# Patient Record
Sex: MALE | Race: WHITE | Employment: OTHER | URBAN - METROPOLITAN AREA
[De-identification: names, ages, dates, MRNs, and addresses within clinical notes are randomized per-mention and may not be internally consistent; named-entity substitution may affect disease eponyms.]

---

## 2017-02-24 ENCOUNTER — TELEPHONE (OUTPATIENT)
Dept: CARDIOLOGY CLINIC | Age: 82
End: 2017-02-24

## 2017-02-24 ENCOUNTER — OFFICE VISIT (OUTPATIENT)
Dept: CARDIOLOGY CLINIC | Age: 82
End: 2017-02-24

## 2017-02-24 VITALS
SYSTOLIC BLOOD PRESSURE: 140 MMHG | WEIGHT: 184.8 LBS | HEIGHT: 71 IN | DIASTOLIC BLOOD PRESSURE: 84 MMHG | BODY MASS INDEX: 25.87 KG/M2 | HEART RATE: 71 BPM

## 2017-02-24 DIAGNOSIS — I25.10 ATHEROSCLEROSIS OF NATIVE CORONARY ARTERY OF NATIVE HEART WITHOUT ANGINA PECTORIS: Primary | ICD-10-CM

## 2017-02-24 DIAGNOSIS — R55 SYNCOPE AND COLLAPSE: ICD-10-CM

## 2017-02-24 RX ORDER — LOSARTAN POTASSIUM 25 MG/1
25 TABLET ORAL DAILY
COMMUNITY
End: 2017-03-10 | Stop reason: SDUPTHER

## 2017-02-24 NOTE — MR AVS SNAPSHOT
Visit Information Date & Time Provider Department Dept. Phone Encounter #  
 2/24/2017  9:40 AM Cassius Richardson MD [facility] Rickie Rodriguez [phone] [id number] Follow-up Instructions Return in about 2 weeks (around 3/10/2017). Follow-up and Disposition History Upcoming Health Maintenance Date Due  
 GLAUCOMA SCREENING Q2Y 11/12/2002 INFLUENZA AGE 9 TO ADULT 8/1/2016 MEDICARE YEARLY EXAM 4/26/2017 DTaP/Tdap/Td series (2 - Td) 9/4/2024 Allergies as of 2/24/2017  Review Complete On: 2/24/2017 By: Cassius Richardson MD  
  
 Severity Noted Reaction Type Reactions Beta Blocker [Beta-blockers (Beta-adrenergic Blocking Agts)]  10/13/2010    Swelling, Other (comments) Confusion, fatigue, and weakness Facial swelling Iodine  03/03/2009    Swelling Facial swelling Current Immunizations  Reviewed on 4/25/2016 Name Date Hep A Vaccine 1/1/1995 Hep A and Hep B Vaccine 9/4/2014 Hep B Vaccine 1/1/1995 Influenza Vaccine 10/1/2015 Pneumococcal Conjugate (PCV-13) 10/29/2015 Pneumococcal Polysaccharide (PPSV-23) 9/30/2014 Tdap 9/4/2014 Zoster Vaccine, Live 1/1/2006 Not reviewed this visit You Were Diagnosed With   
  
 Codes Comments Atherosclerosis of native coronary artery of native heart without angina pectoris    -  Primary ICD-10-CM: I25.10 ICD-9-CM: 414.01 Syncope and collapse     ICD-10-CM: R55 
ICD-9-CM: 780. 2 Vitals BP  
  
  
  
  
  
 140/84 (BP 1 Location: Right arm, BP Patient Position: Supine) Vitals History BMI and BSA Data Body Mass Index Body Surface Area 25.77 kg/m 2 2.05 m 2 Preferred Pharmacy Pharmacy Name Phone [facility], [address] [phone] Your Updated Medication List  
  
   
This list is accurate as of: 2/24/17 10:59 AM.  Always use your most recent med list.  
  
 aspirin 325 mg tablet Commonly known as:  ASPIRIN  
take 325 mg by mouth daily. atorvastatin 80 mg tablet Commonly known as:  LIPITOR  
TAKE ONE TABLET BY MOUTH EVERY EVENING  
  
 AZILECT 1 mg tablet Generic drug:  rasagiline Take 10 mg by mouth daily. COMTAN 200 mg tablet Generic drug:  entacapone Take 200 mg by mouth five (5) times daily. Same times a Sinemet COZAAR 25 mg tablet Generic drug:  losartan Take 25 mg by mouth daily. Take it after breakfast  
  
 donepezil 10 mg tablet Commonly known as:  ARICEPT Take 10 mg by mouth nightly. MYRBETRIQ 25 mg ER tablet Generic drug:  mirabegron ER Take 25 mg by mouth daily. PriLOSEC 20 mg capsule Generic drug:  omeprazole Take 20 mg by mouth Daily (before breakfast). * SINEMET  mg per tablet Generic drug:  carbidopa-levodopa Take 1 Tab by mouth four (4) times daily. Patient takes at 6am, 10am, 2pm, 6pm, 2 tabs at bedtime (It is very important for the patient to receive his medication on time or he will experience side effects)  At 6am, patient takes 1 and 1/2 tablet. Indications: PARKINSONISM * carbidopa-levodopa CR  mg per tablet Commonly known as:  SINEMET CR Take 1 Tab by mouth nightly. Patient takes at 10 pm (It is very important for the patient to receive his medication on time or he will experience side effects) * Notice: This list has 2 medication(s) that are the same as other medications prescribed for you. Read the directions carefully, and ask your doctor or other care provider to review them with you. We Performed the Following AMB POC EKG ROUTINE W/ 12 LEADS, INTER & REP [81725 CPT(R)] Follow-up Instructions Return in about 2 weeks (around 3/10/2017). Patient Instructions Decrease Cozaar 25 mg every morning after breakfast 
 
Compression stockings Follow up with Francesca Mcclain in 2 weeks Introducing [facility]! Junior Honeycutt introduces [employer] patient portal. Now you can access parts of your medical record, email your doctor's office, and request medication refills online. 1. In your internet browser, go to [email or web address] 2. Click on the First Time User? Click Here link in the Sign In box. You will see the New Member Sign Up page. 3. Enter your [employer] Access Code exactly as it appears below. You will not need to use this code after youve completed the sign-up process. If you do not sign up before the expiration date, you must request a new code. · [employer] Access Code: [email or web address] Expires: 5/25/2017 10:59 AM 
 
4. Enter the last four digits of your Social Security Number (xxxx) and Date of Birth (mm/dd/yyyy) as indicated and click Submit. You will be taken to the next sign-up page. 5. Create a [employer] ID. This will be your [employer] login ID and cannot be changed, so think of one that is secure and easy to remember. 6. Create a [employer] password. You can change your password at any time. 7. Enter your Password Reset Question and Answer. This can be used at a later time if you forget your password. 8. Enter your e-mail address. You will receive e-mail notification when new information is available in [address] 9. Click Sign Up. You can now view and download portions of your medical record. 10. Click the Download Summary menu link to download a portable copy of your medical information. If you have questions, please visit the Frequently Asked Questions section of the [employer] website. Remember, [employer] is NOT to be used for urgent needs. For medical emergencies, dial 911. Now available from your iPhone and Android! Please provide this summary of care documentation to your next provider. Your primary care clinician is listed as Tiff 68. If you have any questions after today's visit, please call [phone].

## 2017-02-24 NOTE — PATIENT INSTRUCTIONS
Decrease Cozaar 25 mg every morning after breakfast    Compression stockings    Follow up with Tao Swann in 2 weeks
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
## 2017-03-10 ENCOUNTER — OFFICE VISIT (OUTPATIENT)
Dept: CARDIOLOGY CLINIC | Age: 82
End: 2017-03-10

## 2017-03-10 VITALS
BODY MASS INDEX: 25.79 KG/M2 | HEIGHT: 71 IN | DIASTOLIC BLOOD PRESSURE: 64 MMHG | WEIGHT: 184.2 LBS | SYSTOLIC BLOOD PRESSURE: 118 MMHG | HEART RATE: 63 BPM | OXYGEN SATURATION: 97 %

## 2017-03-10 DIAGNOSIS — G20 PARKINSON DISEASE (HCC): ICD-10-CM

## 2017-03-10 DIAGNOSIS — R55 SYNCOPE AND COLLAPSE: ICD-10-CM

## 2017-03-10 DIAGNOSIS — I25.10 ATHEROSCLEROSIS OF NATIVE CORONARY ARTERY OF NATIVE HEART WITHOUT ANGINA PECTORIS: Primary | ICD-10-CM

## 2017-03-10 DIAGNOSIS — E78.2 MIXED HYPERLIPIDEMIA: ICD-10-CM

## 2017-03-10 DIAGNOSIS — I95.1 DYSAUTONOMIA ORTHOSTATIC HYPOTENSION SYNDROME: ICD-10-CM

## 2017-03-10 RX ORDER — LOSARTAN POTASSIUM 25 MG/1
25 TABLET ORAL DAILY
Qty: 45 TAB | Refills: 3 | Status: SHIPPED | OUTPATIENT
Start: 2017-03-10 | End: 2017-05-08 | Stop reason: SDUPTHER

## 2017-03-10 NOTE — PATIENT INSTRUCTIONS
Start taking cozaar 1 tablet (25 mg) at 6 am and 1/2 tablet (12.5 mg) at 3 pm.     Follow up with Dr. Inder Rutherford in 3 months.

## 2017-03-10 NOTE — MR AVS SNAPSHOT
Visit Information Date & Time Provider Department Dept. Phone Encounter #  
 3/10/2017  1:40 PM Cari Gaspar MD [facility] Vashti Jones [phone] [id number] Follow-up Instructions Return in about 3 months (around 6/10/2017). Follow-up and Disposition History Upcoming Health Maintenance Date Due  
 GLAUCOMA SCREENING Q2Y 11/12/2002 INFLUENZA AGE 9 TO ADULT 8/1/2016 MEDICARE YEARLY EXAM 4/26/2017 DTaP/Tdap/Td series (2 - Td) 9/4/2024 Allergies as of 3/10/2017  Review Complete On: 3/10/2017 By: Cari Gaspar MD  
  
 Severity Noted Reaction Type Reactions Beta Blocker [Beta-blockers (Beta-adrenergic Blocking Agts)]  10/13/2010    Swelling, Other (comments) Confusion, fatigue, and weakness Facial swelling Iodine  03/03/2009    Swelling Facial swelling Current Immunizations  Reviewed on 4/25/2016 Name Date Hep A Vaccine 1/1/1995 Hep A and Hep B Vaccine 9/4/2014 Hep B Vaccine 1/1/1995 Influenza Vaccine 10/1/2015 Pneumococcal Conjugate (PCV-13) 10/29/2015 Pneumococcal Polysaccharide (PPSV-23) 9/30/2014 Tdap 9/4/2014 Zoster Vaccine, Live 1/1/2006 Not reviewed this visit You Were Diagnosed With   
  
 Codes Comments Atherosclerosis of native coronary artery of native heart without angina pectoris    -  Primary ICD-10-CM: I25.10 ICD-9-CM: 414.01 Syncope and collapse     ICD-10-CM: R55 
ICD-9-CM: 780.2 Mixed hyperlipidemia     ICD-10-CM: E78.2 ICD-9-CM: 272.2 Parkinson disease ([facility] [address]     ICD-10-CM: G20 
ICD-9-CM: 332.0 Dysautonomia orthostatic hypotension syndrome (HCC)     ICD-10-CM: G90.3 ICD-9-CM: 333.0 Vitals BP Pulse Height(growth percentile) Weight(growth percentile) SpO2 BMI  
 118/64 (BP 1 Location: Right arm, BP Patient Position: Supine) 63 5' 11\" (1.803 m) 184 lb 3.2 oz (83.6 kg) 97% 25.69 kg/m2 Smoking Status Never Smoker Vitals History BMI and BSA Data Body Mass Index Body Surface Area  
 25.69 kg/m 2 2.05 m 2 Preferred Pharmacy Pharmacy Name Phone [facility], [address] [phone] Your Updated Medication List  
  
   
This list is accurate as of: 3/10/17  2:48 PM.  Always use your most recent med list.  
  
  
  
  
 aspirin 325 mg tablet Commonly known as:  ASPIRIN  
take 325 mg by mouth daily. atorvastatin 80 mg tablet Commonly known as:  LIPITOR  
TAKE ONE TABLET BY MOUTH EVERY EVENING  
  
 AZILECT 1 mg tablet Generic drug:  rasagiline Take 10 mg by mouth daily. COMTAN 200 mg tablet Generic drug:  entacapone Take 200 mg by mouth five (5) times daily. Same times a Sinemet  
  
 donepezil 10 mg tablet Commonly known as:  ARICEPT Take 10 mg by mouth nightly. losartan 25 mg tablet Commonly known as:  COZAAR Take 1 Tab by mouth daily. Take 1 tablet (25 mg) at 6 am.  Take 1/2 tablet (12.5 mg) at 3 pm.  
  
 MYRBETRIQ 25 mg ER tablet Generic drug:  mirabegron ER Take 25 mg by mouth daily. PriLOSEC 20 mg capsule Generic drug:  omeprazole Take 20 mg by mouth Daily (before breakfast). * SINEMET  mg per tablet Generic drug:  carbidopa-levodopa Take 1 Tab by mouth four (4) times daily. Patient takes at 6am, 10am, 2pm, 6pm, 2 tabs at bedtime (It is very important for the patient to receive his medication on time or he will experience side effects)  At 6am, patient takes 1 and 1/2 tablet. Indications: PARKINSONISM * carbidopa-levodopa CR  mg per tablet Commonly known as:  SINEMET CR Take 1 Tab by mouth nightly. Patient takes at 10 pm (It is very important for the patient to receive his medication on time or he will experience side effects) * Notice:   This list has 2 medication(s) that are the same as other medications prescribed for you. Read the directions carefully, and ask your doctor or other care provider to review them with you. Prescriptions Sent to Pharmacy Refills  
 losartan (COZAAR) 25 mg tablet 3 Sig: Take 1 Tab by mouth daily. Take 1 tablet (25 mg) at 6 am.  Take 1/2 tablet (12.5 mg) at 3 pm.  
 Class: Normal  
 Pharmacy: [facility] [address], [address] Ph #: [phone] Route: Oral  
  
Follow-up Instructions Return in about 3 months (around 6/10/2017). Patient Instructions Start taking cozaar 1 tablet (25 mg) at 6 am and 1/2 tablet (12.5 mg) at 3 pm.  
 
Follow up with Dr. Marylen Gale in 3 months. Introducing [facility]! Mohit Luther introduces [employer] patient portal. Now you can access parts of your medical record, email your doctor's office, and request medication refills online. 1. In your internet browser, go to [email or web address] 2. Click on the First Time User? Click Here link in the Sign In box. You will see the New Member Sign Up page. 3. Enter your [employer] [facility] Access Code exactly as it appears below. You will not need to use this code after youve completed the sign-up process. If you do not sign up before the expiration date, you must request a new code. · [employer] Access Code: [email or web address] Expires: 5/25/2017 10:59 AM 
 
4. Enter the last four digits of your Social Security Number (xxxx) and Date of Birth (mm/dd/yyyy) as indicated and click Submit. You will be taken to the next sign-up page. 5. Create a [employer] ID. This will be your [facility] login ID and cannot be changed, so think of one that is secure and easy to remember. 6. Create a [employer] password. You can change your password at any time. 7. Enter your Password Reset Question and Answer. This can be used at a later time if you forget your password. 8. Enter your e-mail address. You will receive e-mail notification when new information is available in [address] 9. Click Sign Up. You can now view and download portions of your medical record. 10. Click the Download Summary menu link to download a portable copy of your medical information. If you have questions, please visit the Frequently Asked Questions section of the [employer] website. Remember, [employer] is NOT to be used for urgent needs. For medical emergencies, dial 911. Now available from your iPhone and Android! Please provide this summary of care documentation to your next provider. Your primary care clinician is listed as Ysitie 68. If you have any questions after today's visit, please call [phone].
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
## 2017-05-08 RX ORDER — LOSARTAN POTASSIUM 25 MG/1
TABLET ORAL
Qty: 45 TAB | Refills: 3 | Status: ON HOLD | OUTPATIENT
Start: 2017-05-08 | End: 2018-04-03

## 2017-06-09 ENCOUNTER — OFFICE VISIT (OUTPATIENT)
Dept: CARDIOLOGY CLINIC | Age: 82
End: 2017-06-09

## 2017-06-09 VITALS
WEIGHT: 182 LBS | BODY MASS INDEX: 25.48 KG/M2 | DIASTOLIC BLOOD PRESSURE: 70 MMHG | HEIGHT: 71 IN | SYSTOLIC BLOOD PRESSURE: 110 MMHG | HEART RATE: 61 BPM

## 2017-06-09 DIAGNOSIS — I25.10 ATHEROSCLEROSIS OF NATIVE CORONARY ARTERY OF NATIVE HEART WITHOUT ANGINA PECTORIS: Primary | ICD-10-CM

## 2017-06-09 DIAGNOSIS — I10 ESSENTIAL HYPERTENSION: ICD-10-CM
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
## 2017-06-09 NOTE — PATIENT INSTRUCTIONS
Follow up with Niharika Guillen in 4 months    Hold Cozaar BP less than 110    Give extra dose of Cozaar 25 mg above number 170 check twice

## 2017-06-16 ENCOUNTER — DOCUMENTATION ONLY (OUTPATIENT)
Dept: CARDIOLOGY CLINIC | Age: 82
End: 2017-06-16

## 2017-07-23 ENCOUNTER — HOSPITAL ENCOUNTER (EMERGENCY)
Age: 82
Discharge: HOME | End: 2017-07-23
Attending: EMERGENCY MEDICINE
Payer: MEDICARE

## 2017-07-23 ENCOUNTER — APPOINTMENT (OUTPATIENT)
Dept: GENERAL RADIOLOGY | Age: 82
End: 2017-07-23
Attending: EMERGENCY MEDICINE
Payer: MEDICARE

## 2017-07-23 VITALS
WEIGHT: 185 LBS | HEART RATE: 70 BPM | RESPIRATION RATE: 19 BRPM | HEIGHT: 71 IN | OXYGEN SATURATION: 93 % | TEMPERATURE: 97.7 F | SYSTOLIC BLOOD PRESSURE: 155 MMHG | BODY MASS INDEX: 25.9 KG/M2 | DIASTOLIC BLOOD PRESSURE: 77 MMHG

## 2017-07-23 DIAGNOSIS — R06.02 SOB (SHORTNESS OF BREATH): Primary | ICD-10-CM

## 2017-07-23 LAB
ALBUMIN SERPL BCP-MCNC: 3.6 G/DL (ref 3.5–5)
ALBUMIN/GLOB SERPL: 1.2 {RATIO} (ref 1.1–2.2)
ALP SERPL-CCNC: 72 U/L (ref 45–117)
ALT SERPL-CCNC: 9 U/L (ref 12–78)
ANION GAP BLD CALC-SCNC: 7 MMOL/L (ref 5–15)
AST SERPL W P-5'-P-CCNC: 19 U/L (ref 15–37)
ATRIAL RATE: 72 BPM
BASOPHILS # BLD AUTO: 0 K/UL (ref 0–0.1)
BASOPHILS # BLD: 0 % (ref 0–1)
BILIRUB SERPL-MCNC: 0.5 MG/DL (ref 0.2–1)
BNP SERPL-MCNC: 114 PG/ML (ref 0–450)
BUN SERPL-MCNC: 28 MG/DL (ref 6–20)
BUN/CREAT SERPL: 21 (ref 12–20)
CALCIUM SERPL-MCNC: 8.7 MG/DL (ref 8.5–10.1)
CALCULATED P AXIS, ECG09: 52 DEGREES
CALCULATED R AXIS, ECG10: 78 DEGREES
CALCULATED T AXIS, ECG11: 11 DEGREES
CHLORIDE SERPL-SCNC: 104 MMOL/L (ref 97–108)
CK MB CFR SERPL CALC: 2.6 % (ref 0–2.5)
CK MB SERPL-MCNC: 1.9 NG/ML (ref 5–25)
CK SERPL-CCNC: 74 U/L (ref 39–308)
CO2 SERPL-SCNC: 29 MMOL/L (ref 21–32)
CREAT SERPL-MCNC: 1.35 MG/DL (ref 0.7–1.3)
D DIMER PPP FEU-MCNC: 0.38 MG/L FEU (ref 0–0.65)
DIAGNOSIS, 93000: NORMAL
DIFFERENTIAL METHOD BLD: NORMAL
EOSINOPHIL # BLD: 0.1 K/UL (ref 0–0.4)
EOSINOPHIL NFR BLD: 2 % (ref 0–7)
ERYTHROCYTE [DISTWIDTH] IN BLOOD BY AUTOMATED COUNT: 13.3 % (ref 11.5–14.5)
GLOBULIN SER CALC-MCNC: 2.9 G/DL (ref 2–4)
GLUCOSE SERPL-MCNC: 106 MG/DL (ref 65–100)
HCT VFR BLD AUTO: 45.6 % (ref 36.6–50.3)
HGB BLD-MCNC: 15.5 G/DL (ref 12.1–17)
LYMPHOCYTES # BLD AUTO: 32 % (ref 12–49)
LYMPHOCYTES # BLD: 2.2 K/UL
MAGNESIUM SERPL-MCNC: 2.2 MG/DL (ref 1.6–2.4)
MCH RBC QN AUTO: 31.3 PG (ref 26–34)
MCHC RBC AUTO-ENTMCNC: 34 G/DL (ref 30–36.5)
MCV RBC AUTO: 92.1 FL (ref 80–99)
MONOCYTES # BLD: 0.9 K/UL (ref 0–1)
MONOCYTES NFR BLD AUTO: 13 % (ref 5–13)
NEUTS SEG # BLD: 3.6 K/UL (ref 1.8–8)
NEUTS SEG NFR BLD AUTO: 53 % (ref 32–75)
P-R INTERVAL, ECG05: 164 MS
PLATELET # BLD AUTO: 185 K/UL (ref 150–400)
POTASSIUM SERPL-SCNC: 4.1 MMOL/L (ref 3.5–5.1)
PROT SERPL-MCNC: 6.5 G/DL (ref 6.4–8.2)
Q-T INTERVAL, ECG07: 406 MS
QRS DURATION, ECG06: 118 MS
QTC CALCULATION (BEZET), ECG08: 444 MS
RBC # BLD AUTO: 4.95 M/UL (ref 4.1–5.7)
RBC MORPH BLD: NORMAL
SODIUM SERPL-SCNC: 140 MMOL/L (ref 136–145)
TROPONIN I BLD-MCNC: <0.04 NG/ML (ref 0–0.08)
TROPONIN I SERPL-MCNC: 0.06 NG/ML
VENTRICULAR RATE, ECG03: 72 BPM
WBC # BLD AUTO: 6.8 K/UL (ref 4.1–11.1)

## 2017-07-23 PROCEDURE — 85025 COMPLETE CBC W/AUTO DIFF WBC: CPT | Performed by: EMERGENCY MEDICINE

## 2017-07-23 PROCEDURE — 83735 ASSAY OF MAGNESIUM: CPT | Performed by: EMERGENCY MEDICINE

## 2017-07-23 PROCEDURE — 84484 ASSAY OF TROPONIN QUANT: CPT | Performed by: EMERGENCY MEDICINE

## 2017-07-23 PROCEDURE — 71020 XR CHEST PA LAT: CPT

## 2017-07-23 PROCEDURE — 85379 FIBRIN DEGRADATION QUANT: CPT | Performed by: EMERGENCY MEDICINE

## 2017-07-23 PROCEDURE — 83880 ASSAY OF NATRIURETIC PEPTIDE: CPT | Performed by: EMERGENCY MEDICINE

## 2017-07-23 PROCEDURE — 82550 ASSAY OF CK (CPK): CPT | Performed by: EMERGENCY MEDICINE

## 2017-07-23 PROCEDURE — 36415 COLL VENOUS BLD VENIPUNCTURE: CPT | Performed by: EMERGENCY MEDICINE

## 2017-07-23 PROCEDURE — 93005 ELECTROCARDIOGRAM TRACING: CPT

## 2017-07-23 PROCEDURE — 80053 COMPREHEN METABOLIC PANEL: CPT | Performed by: EMERGENCY MEDICINE

## 2017-07-23 PROCEDURE — 99285 EMERGENCY DEPT VISIT HI MDM: CPT

## 2017-07-23 NOTE — DISCHARGE INSTRUCTIONS
Shortness of Breath: Care Instructions  Your Care Instructions  Shortness of breath has many causes. Sometimes conditions such as anxiety can lead to shortness of breath. Some people get mild shortness of breath when they exercise. Trouble breathing also can be a symptom of a serious problem, such as asthma, lung disease, emphysema, heart problems, and pneumonia. If your shortness of breath continues, you may need tests and treatment. Watch for any changes in your breathing and other symptoms. Follow-up care is a key part of your treatment and safety. Be sure to make and go to all appointments, and call your doctor if you are having problems. Its also a good idea to know your test results and keep a list of the medicines you take. How can you care for yourself at home? · Do not smoke or allow others to smoke around you. If you need help quitting, talk to your doctor about stop-smoking programs and medicines. These can increase your chances of quitting for good. · Get plenty of rest and sleep. · Take your medicines exactly as prescribed. Call your doctor if you think you are having a problem with your medicine. · Find healthy ways to deal with stress. ¨ Exercise daily. ¨ Get plenty of sleep. ¨ Eat regularly and well. When should you call for help? Call 911 anytime you think you may need emergency care. For example, call if:  · You have severe shortness of breath. · You have symptoms of a heart attack. These may include:  ¨ Chest pain or pressure, or a strange feeling in the chest.  ¨ Sweating. ¨ Shortness of breath. ¨ Nausea or vomiting. ¨ Pain, pressure, or a strange feeling in the back, neck, jaw, or upper belly or in one or both shoulders or arms. ¨ Lightheadedness or sudden weakness. ¨ A fast or irregular heartbeat. After you call 911, the  may tell you to chew 1 adult-strength or 2 to 4 low-dose aspirin. Wait for an ambulance. Do not try to drive yourself.   Call your doctor now or seek immediate medical care if:  · Your shortness of breath gets worse or you start to wheeze. Wheezing is a high-pitched sound when you breathe. · You wake up at night out of breath or have to prop your head up on several pillows to breathe. · You are short of breath after only light activity or while at rest.  Watch closely for changes in your health, and be sure to contact your doctor if:  · You do not get better over the next 1 to 2 days. Where can you learn more? Go to http://jose martin-jorge luis.info/. Enter S780 in the search box to learn more about \"Shortness of Breath: Care Instructions. \"  Current as of: March 25, 2017  Content Version: 11.3  © 7652-3932 [employer]. Care instructions adapted under license by [employer] (which disclaims liability or warranty for this information). If you have questions about a medical condition or this instruction, always ask your healthcare professional. [place] [address] any warranty or liability for your use of this information. We hope that we have addressed all of your medical concerns. The examination and treatment you received in the Emergency Department were for an emergent problem and were not intended as complete care. It is important that you follow up with your healthcare provider(s) for ongoing care. If your symptoms worsen or do not improve as expected, and you are unable to reach your usual health care provider(s), you should return to the Emergency Department. Today's healthcare is undergoing tremendous change, and patient satisfaction surveys are one of the many tools to assess the quality of medical care. You may receive a survey from the [employer] organization regarding your experience in the Emergency Department. I hope that your experience has been completely positive, particularly the medical care that I provided.   As such, please participate in the survey; anything less than excellent does not meet my expectations or intentions. Thank you for allowing us to provide you with medical care today. We realize that you have many choices for your emergency care needs. Please choose us in the future for any continued health care needs. Arcadio Aiken Laquetta Castleman, [address] [place], [place] [address]y [address].   Office: [phone]            Recent Results (from the past 24 hour(s))   EKG, 12 LEAD, INITIAL    Collection Time: 07/23/17  5:09 AM   Result Value Ref Range    Ventricular Rate 72 BPM    Atrial Rate 72 BPM    P-R Interval 164 ms    QRS Duration 118 ms    Q-T Interval 406 ms    QTC Calculation (Bezet) 444 ms    Calculated P Axis 52 degrees    Calculated R Axis 78 degrees    Calculated T Axis 11 degrees    Diagnosis       Normal sinus rhythm  Right bundle branch block  Abnormal ECG  When compared with ECG of 11-MAR-2016 14:56,  No significant change was found     CBC WITH AUTOMATED DIFF    Collection Time: 07/23/17  5:19 AM   Result Value Ref Range    WBC 6.8 4.1 - 11.1 K/uL    RBC 4.95 4.10 - 5.70 M/uL    HGB 15.5 12.1 - 17.0 g/dL    HCT 45.6 36.6 - 50.3 %    MCV 92.1 80.0 - 99.0 FL    MCH 31.3 26.0 - 34.0 PG    MCHC 34.0 30.0 - 36.5 g/dL    RDW 13.3 11.5 - 14.5 %    PLATELET 480 591 - 458 K/uL    NEUTROPHILS 53 32 - 75 %    LYMPHOCYTES 32 12 - 49 %    MONOCYTES 13 5 - 13 %    EOSINOPHILS 2 0 - 7 %    BASOPHILS 0 0 - 1 %    ABS. NEUTROPHILS 3.6 1.8 - 8.0 K/UL    ABS. LYMPHOCYTES 2.2 K/UL    ABS. MONOCYTES 0.9 0.0 - 1.0 K/UL    ABS. EOSINOPHILS 0.1 0.0 - 0.4 K/UL    ABS.  BASOPHILS 0.0 0.0 - 0.1 K/UL    DF SMEAR SCANNED      RBC COMMENTS NORMOCYTIC, NORMOCHROMIC     METABOLIC PANEL, COMPREHENSIVE    Collection Time: 07/23/17  5:19 AM   Result Value Ref Range    Sodium 140 136 - 145 mmol/L    Potassium 4.1 3.5 - 5.1 mmol/L    Chloride 104 97 - 108 mmol/L    CO2 29 21 - 32 mmol/L    Anion gap 7 5 - 15 mmol/L    Glucose 106 (H) 65 - 100 mg/dL    BUN 28 (H) 6 - 20 MG/DL Creatinine 1.35 (H) 0.70 - 1.30 MG/DL    BUN/Creatinine ratio 21 (H) 12 - 20      GFR est AA >60 >60 ml/min/1.73m2    GFR est non-AA 51 (L) >60 ml/min/1.73m2    Calcium 8.7 8.5 - 10.1 MG/DL    Bilirubin, total 0.5 0.2 - 1.0 MG/DL    ALT (SGPT) 9 (L) 12 - 78 U/L    AST (SGOT) 19 15 - 37 U/L    Alk. phosphatase 72 45 - 117 U/L    Protein, total 6.5 6.4 - 8.2 g/dL    Albumin 3.6 3.5 - 5.0 g/dL    Globulin 2.9 2.0 - 4.0 g/dL    A-G Ratio 1.2 1.1 - 2.2     NT-PRO BNP    Collection Time: 07/23/17  5:19 AM   Result Value Ref Range    NT pro- 0 - 450 PG/ML   CK W/ CKMB & INDEX    Collection Time: 07/23/17  5:19 AM   Result Value Ref Range    CK 74 39 - 308 U/L    CK - MB 1.9 <3.6 NG/ML    CK-MB Index 2.6 (H) 0 - 2.5     MAGNESIUM    Collection Time: 07/23/17  5:19 AM   Result Value Ref Range    Magnesium 2.2 1.6 - 2.4 mg/dL   TROPONIN I    Collection Time: 07/23/17  5:19 AM   Result Value Ref Range    Troponin-I, Qt. 0.06 <0.08 ng/mL   D DIMER    Collection Time: 07/23/17  5:26 AM   Result Value Ref Range    D-dimer 0.38 0.00 - 0.65 mg/L FEU       Xr Chest Pa Lat    Result Date: 7/23/2017  INDICATION:   sob EXAM:  PA and Lateral Chest Radiographs COMPARISON: None FINDINGS: PA and lateral views of the chest demonstrate a normal cardiomediastinal silhouette. The lungs are adequately expanded. There is no edema, effusion, consolidation, or pneumothorax. Old healed left posterior rib fractures are noted. IMPRESSION: No acute process.

## 2017-07-23 NOTE — ED NOTES
Discharge note: The patient was discharged home in stable condition, accompanied by wife. The patient is alert and oriented, is in no respiratory distress and has vital signs within normal limits. The patient's diagnosis, condition and treatment were explained to patient by Dr Sherita Gray and reinforced by nurse. The patient and family party expressed understanding of discharge instructions and plan of care. A discharge plan has been developed. A  was not involved in the process. Patient assisted out to car to ED lobby to go home with wife.
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
## 2017-07-23 NOTE — ED NOTES
AIDET communication provided and informed of purposeful rounding to include collaboration of entire care team; patient and wife acknowledged understanding. Pt given pillow and blanket for comfort.

## 2017-07-25 ENCOUNTER — PATIENT OUTREACH (OUTPATIENT)
Dept: INTERNAL MEDICINE CLINIC | Age: 82
End: 2017-07-25

## 2017-07-25 NOTE — PROGRESS NOTES
NNTOCED Call    Patient discharged on 7/23/17 from [facility]. Diagnosis: SOB. Spoke briefly with patient's wife (identified by 2 patient identifiers) today. Wife reports pt is, \"doing fine, but should come in and see  Minus Session. \" Wife denies further issues with SOB, and suggests possible anxiety? Attempted to make pt a f/u appt tomorrow with PCP, wife declined since she has to drive. Offered to schedule an appt with NP, declined. Wife preferred an appt next week after 12N except for Tues. This NN will have PCP & nurse review schedule for possible appt, schedule currently booked. PCP nurse will call wife appt day & time. Cardio NN updated. This note will not be viewable in [address]

## 2017-07-26 ENCOUNTER — PATIENT OUTREACH (OUTPATIENT)
Dept: CARDIOLOGY CLINIC | Age: 82
End: 2017-07-26

## 2017-07-31 ENCOUNTER — TELEPHONE (OUTPATIENT)
Dept: INTERNAL MEDICINE CLINIC | Age: 82
End: 2017-07-31

## 2017-07-31 NOTE — TELEPHONE ENCOUNTER
----- Message from Shanell Velazquez MD sent at 7/29/2017  8:19 AM EDT -----  Please help make appt for him for next week-danitza  ----- Message -----     From: Sasha Coleman RN     Sent: 7/25/2017   3:06 PM       To: Shanell Velazuqez MD, Daniel Hannah LPMARIO ALBERTO Silva,    I know Robina Barnes is off, so I did not know who to send this message too? Please review schedule for possible ER f/u appt next week for this pt. Wife declined appt tomorrow, but would prefer an appt next week after 12 N except for Tues.      Thanks - Greer

## 2017-08-02 ENCOUNTER — OFFICE VISIT (OUTPATIENT)
Dept: INTERNAL MEDICINE CLINIC | Age: 82
End: 2017-08-02

## 2017-08-02 VITALS
TEMPERATURE: 97.5 F | WEIGHT: 181 LBS | BODY MASS INDEX: 25.34 KG/M2 | OXYGEN SATURATION: 98 % | RESPIRATION RATE: 16 BRPM | HEIGHT: 71 IN | HEART RATE: 76 BPM | SYSTOLIC BLOOD PRESSURE: 138 MMHG | DIASTOLIC BLOOD PRESSURE: 70 MMHG

## 2017-08-02 DIAGNOSIS — R06.02 SOB (SHORTNESS OF BREATH): Primary | ICD-10-CM

## 2017-08-02 DIAGNOSIS — F41.9 ANXIETY: ICD-10-CM

## 2017-08-02 DIAGNOSIS — R44.1 VISUAL HALLUCINATION: ICD-10-CM

## 2017-08-02 RX ORDER — DOCUSATE SODIUM 100 MG/1
300 CAPSULE, LIQUID FILLED ORAL DAILY
Status: ON HOLD | COMMUNITY
End: 2020-06-26

## 2017-08-02 RX ORDER — ENTACAPONE 200 MG/1
1 TABLET ORAL 2 TIMES DAILY
COMMUNITY
End: 2019-12-16

## 2017-08-02 RX ORDER — ACETAMINOPHEN 500 MG
TABLET ORAL 2 TIMES DAILY
COMMUNITY
End: 2018-04-03

## 2017-08-02 RX ORDER — BUSPIRONE HYDROCHLORIDE 5 MG/1
5 TABLET ORAL 2 TIMES DAILY
Qty: 60 TAB | Refills: 1 | Status: SHIPPED | OUTPATIENT
Start: 2017-08-02 | End: 2018-02-13 | Stop reason: SDUPTHER

## 2017-08-02 NOTE — MR AVS SNAPSHOT
Visit Information Date & Time Provider Department Dept. Phone Encounter #  
 8/2/2017  2:00 PM Rianna Sommers MD [facility] [address] [id number] Your Appointments 10/9/2017  8:40 AM  
ESTABLISHED PATIENT with Darnell Pendleton MD  
[facility] ([facility]) Appt Note: 4 month f/u  
 [address] 1400 [address] [place] [address] [address] [facility] [phone] Upcoming Health Maintenance Date Due  
 GLAUCOMA SCREENING Q2Y 11/12/2002 MEDICARE YEARLY EXAM 4/26/2017 INFLUENZA AGE 9 TO ADULT 8/1/2017 DTaP/Tdap/Td series (2 - Td) 9/4/2024 Allergies as of 8/2/2017  Review Complete On: 8/2/2017 By: Rianna Sommers MD  
  
 Severity Noted Reaction Type Reactions Beta Blocker [Beta-blockers (Beta-adrenergic Blocking Agts)]  10/13/2010    Swelling, Other (comments) Confusion, fatigue, and weakness Facial swelling Iodine  03/03/2009    Swelling Facial swelling Current Immunizations  Reviewed on 4/25/2016 Name Date Hep A Vaccine 1/1/1995 Hep A and Hep B Vaccine 9/4/2014 Hep B Vaccine 1/1/1995 Influenza Vaccine 10/1/2015 Pneumococcal Conjugate (PCV-13) 10/29/2015 Pneumococcal Polysaccharide (PPSV-23) 9/30/2014 Tdap 9/4/2014 Zoster Vaccine, Live 1/1/2006 Not reviewed this visit You Were Diagnosed With   
  
 Codes Comments SOB (shortness of breath)    -  Primary ICD-10-CM: R06.02 
ICD-9-CM: 786.05 Anxiety     ICD-10-CM: F41.9 ICD-9-CM: 300.00 Visual hallucination     ICD-10-CM: R44.1 ICD-9-CM: 368.16 Vitals BP Pulse Temp Resp Height(growth percentile) Weight(growth percentile) 138/70 (BP 1 Location: Left arm, BP Patient Position: Sitting) 76 97.5 °F (36.4 °C) (Oral) 16 5' 11\" (1.803 m) 181 lb (82.1 kg) SpO2 BMI Smoking Status 98% 25.24 kg/m2 Never Smoker Vitals History BMI and BSA Data Body Mass Index Body Surface Area  
 25.24 kg/m 2 2.03 m 2 Preferred Pharmacy Pharmacy Name Phone [facility], [address] [phone] Your Updated Medication List  
  
   
This list is accurate as of: 8/2/17  2:19 PM.  Always use your most recent med list.  
  
  
  
  
 aspirin 325 mg tablet Commonly known as:  ASPIRIN  
take 325 mg by mouth daily. atorvastatin 80 mg tablet Commonly known as:  LIPITOR  
TAKE ONE TABLET BY MOUTH EVERY EVENING  
  
 busPIRone 5 mg tablet Commonly known as:  BUSPAR Take 1 Tab by mouth two (2) times a day. Cholecalciferol (Vitamin D3) 2,000 unit Cap capsule Commonly known as:  VITAMIN D3 Take  by mouth two (2) times a day. * COMTAN 200 mg tablet Generic drug:  entacapone Take 200 mg by mouth five (5) times daily. Same times a Sinemet * entacapone 200 mg tablet Commonly known as:  Brayan Brawn Take 1 Tab by mouth three (3) times daily. docusate sodium 100 mg capsule Commonly known as:  Mellissa Petrin Take 100 mg by mouth two (2) times a day. donepezil 10 mg tablet Commonly known as:  ARICEPT Take 10 mg by mouth nightly. losartan 25 mg tablet Commonly known as:  COZAAR Take 1 tablet (25 mg) at 6 am.  Take 1/2 tablet (12.5 mg) at 3 pm.  
  
 MYRBETRIQ 25 mg ER tablet Generic drug:  mirabegron ER Take 25 mg by mouth daily. pimavanserin 17 mg Tab Take 2 Tabs by mouth daily. SINEMET  mg per tablet Generic drug:  carbidopa-levodopa Take 1 Tab by mouth five (5) times daily. Patient takes at 6am, 10am, 2pm, 6pm, 2 tabs at bedtime (It is very important for the patient to receive his medication on time or he will experience side effects)  At 6am, patient takes 1 and 1/2 tablet. Indications: Parkinsonism * Notice:   This list has 2 medication(s) that are the same as other medications prescribed for you. Read the directions carefully, and ask your doctor or other care provider to review them with you. Prescriptions Printed Refills  
 busPIRone (BUSPAR) 5 mg tablet 1 Sig: Take 1 Tab by mouth two (2) times a day. Class: Print Route: Oral  
  
To-Do List   
 08/02/2017 ECHO:  2D ECHO COMPLETE ADULT (TTE) W OR WO CONTR Introducing [facility]! Isa Bryant introduces [employer] patient portal. Now you can access parts of your medical record, email your doctor's office, and request medication refills online. 1. In your internet browser, go to [email or web address] 2. Click on the First Time User? Click Here link in the Sign In box. You will see the New Member Sign Up page. 3. Enter your [employer] Access Code exactly as it appears below. You will not need to use this code after youve completed the sign-up process. If you do not sign up before the expiration date, you must request a new code. · [employer] Access Code: [email or web address] Expires: 9/7/2017  2:18 PM 
 
4. Enter the last four digits of your Social Security Number (xxxx) and Date of Birth (mm/dd/yyyy) as indicated and click Submit. You will be taken to the next sign-up page. 5. Create a [employer] ID. This will be your [employer] login ID and cannot be changed, so think of one that is secure and easy to remember. 6. Create a [employer] password. You can change your password at any time. 7. Enter your Password Reset Question and Answer. This can be used at a later time if you forget your password. 8. Enter your e-mail address. You will receive e-mail notification when new information is available in [address] 9. Click Sign Up. You can now view and download portions of your medical record. 10. Click the Download Summary menu link to download a portable copy of your medical information.  
 
If you have questions, please visit the Frequently Asked Questions section of the [employer]. Remember, [employer] is NOT to be used for urgent needs. For medical emergencies, dial 911. Now available from your iPhone and Android! Please provide this summary of care documentation to your next provider. Your primary care clinician is listed as Tiff Lennon. If you have any questions after today's visit, please call [phone].

## 2017-08-02 NOTE — PROGRESS NOTES
HISTORY OF PRESENT ILLNESS  Drake Rosales is a 80 y.o. male. HPI  Seen with wife, Ronak Cowan, who helps with history. He's been having a rough time. He went to the ER with shortness of breath. Chest xray, troponin and D-dimer levels were normal. They felt and family thought that some of his breathing issue was related to anxiety. He's been more anxious because he's been having visual hallucinations, felt to be related to a new Parkinson's med spelled Comtan. They are in the process of tapering off of this medication over a month. He's two weeks into the taper, but still having some visual hallucinations and feeling anxious. He's been given a new medication from his Parkinson's doctors at [place], [address] [facility]. He's not yet started this. They are noting increase in tremor with the taper. He's seen Dr. Bunnie Skiff in the past, but not recently. He's not having chest pain. He is having disrupted sleep, naps late in the day and then trouble sleeping at night. He's generally feeling anxious about overall progression of Parkinson's and decline in function. Review of Systems   Constitutional: Positive for malaise/fatigue. Negative for fever and weight loss. Respiratory: Positive for shortness of breath. Negative for cough. Cardiovascular: Negative for chest pain, palpitations and leg swelling. Musculoskeletal: Negative for falls. Neurological: Positive for tremors. Negative for seizures. Psychiatric/Behavioral: Positive for depression and memory loss. The patient is nervous/anxious. Physical Exam   Constitutional: He is oriented to person, place, and time. He appears well-developed and well-nourished. HENT:   Head: Normocephalic and atraumatic. Neck: Normal range of motion. Neck supple. Carotid bruit is not present. No thyromegaly present. Cardiovascular: Normal rate, regular rhythm and intact distal pulses. Murmur heard.   Pulmonary/Chest: Effort normal and breath sounds normal. No respiratory distress. He has no wheezes. He has no rales. Musculoskeletal: He exhibits no edema. Neurological: He is alert and oriented to person, place, and time. Tremor and slow movement able to get on exam table on his own   Psychiatric: He has a normal mood and affect. His behavior is normal.   Nursing note and vitals reviewed. ASSESSMENT and PLAN  Diagnoses and all orders for this visit:    1. SOB (shortness of breath)-given murmur check echo and keep cardiology follow up as planned  Agree some of it likely from anxiety  -     2D ECHO COMPLETE ADULT (TTE) W OR WO CONTR; Future    2. Anxiety-discussed options-he is coming off azilect and aware that needs to stop this med to try the buspar-reviewed with wife as well who states about to stop azilect anyway  Will also check with rob acosta about safety of buspar   -     busPIRone (BUSPAR) 5 mg tablet; Take 1 Tab by mouth two (2) times a day.     3. Visual hallucination  Presumed from comtan tapering off this as well    the following changes in treatment are made: stop azilect and consider buspar if ok with neurologists

## 2017-10-13 ENCOUNTER — OFFICE VISIT (OUTPATIENT)
Dept: CARDIOLOGY CLINIC | Age: 82
End: 2017-10-13

## 2017-10-13 ENCOUNTER — CLINICAL SUPPORT (OUTPATIENT)
Dept: CARDIOLOGY CLINIC | Age: 82
End: 2017-10-13

## 2017-10-13 VITALS
HEIGHT: 71 IN | RESPIRATION RATE: 26 BRPM | DIASTOLIC BLOOD PRESSURE: 80 MMHG | OXYGEN SATURATION: 96 % | WEIGHT: 177 LBS | BODY MASS INDEX: 24.78 KG/M2 | SYSTOLIC BLOOD PRESSURE: 140 MMHG | HEART RATE: 62 BPM

## 2017-10-13 DIAGNOSIS — I25.10 ATHEROSCLEROSIS OF NATIVE CORONARY ARTERY OF NATIVE HEART WITHOUT ANGINA PECTORIS: ICD-10-CM

## 2017-10-13 DIAGNOSIS — R06.02 SHORTNESS OF BREATH: Primary | ICD-10-CM

## 2017-10-13 DIAGNOSIS — I27.20 PULMONARY HYPERTENSION (HCC): Primary | ICD-10-CM

## 2017-10-13 DIAGNOSIS — R06.02 SHORTNESS OF BREATH: ICD-10-CM
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
## 2017-10-13 NOTE — MR AVS SNAPSHOT
Visit Information Date & Time Provider Department Dept. Phone Encounter #  
 10/13/2017 10:00 AM Jean Cheung MD [facility] [phone] [id number] Follow-up Instructions Return in about 6 months (around 4/13/2018). Follow-up and Disposition History Upcoming Health Maintenance Date Due  
 GLAUCOMA SCREENING Q2Y 11/12/2002 MEDICARE YEARLY EXAM 4/26/2017 INFLUENZA AGE 9 TO ADULT 8/1/2017 DTaP/Tdap/Td series (2 - Td) 9/4/2024 Allergies as of 10/13/2017  Review Complete On: 10/13/2017 By: Jean Cheung MD  
  
 Severity Noted Reaction Type Reactions Beta Blocker [Beta-blockers (Beta-adrenergic Blocking Agts)]  10/13/2010    Swelling, Other (comments) Confusion, fatigue, and weakness Facial swelling Iodine  03/03/2009    Swelling Facial swelling Current Immunizations  Reviewed on 4/25/2016 Name Date Hep A Vaccine 1/1/1995 Hep A and Hep B Vaccine 9/4/2014 Hep B Vaccine 1/1/1995 Influenza Vaccine 10/1/2015 Pneumococcal Conjugate (PCV-13) 10/29/2015 Pneumococcal Polysaccharide (PPSV-23) 9/30/2014 Tdap 9/4/2014 Zoster Vaccine, Live 1/1/2006 Not reviewed this visit You Were Diagnosed With   
  
 Codes Comments Shortness of breath    -  Primary ICD-10-CM: R06.02 
ICD-9-CM: 786.05 Atherosclerosis of native coronary artery of native heart without angina pectoris     ICD-10-CM: I25.10 ICD-9-CM: 414.01 Vitals BP Pulse Resp Height(growth percentile) Weight(growth percentile) SpO2  
 140/80 (BP 1 Location: Left arm, BP Patient Position: Sitting) 62 26 5' 11\" (1.803 m) 177 lb (80.3 kg) 96% BMI Smoking Status 24.69 kg/m2 Never Smoker Vitals History BMI and BSA Data Body Mass Index Body Surface Area  
 24.69 kg/m 2 2.01 m 2 Preferred Pharmacy Pharmacy Name Phone  [facility], [address] [facility] Kirstie Galdamez [phone] Your Updated Medication List  
  
   
This list is accurate as of: 10/13/17 11:44 AM.  Always use your most recent med list.  
  
  
  
  
 aspirin 325 mg tablet Commonly known as:  ASPIRIN  
take 325 mg by mouth daily. atorvastatin 80 mg tablet Commonly known as:  LIPITOR  
TAKE ONE TABLET BY MOUTH EVERY EVENING  
  
 busPIRone 5 mg tablet Commonly known as:  BUSPAR Take 1 Tab by mouth two (2) times a day. Cholecalciferol (Vitamin D3) 2,000 unit Cap capsule Commonly known as:  VITAMIN D3 Take  by mouth two (2) times a day. docusate sodium 100 mg capsule Commonly known as:  Temple Lecher Take 100 mg by mouth two (2) times a day. donepezil 10 mg tablet Commonly known as:  ARICEPT Take 10 mg by mouth nightly. entacapone 200 mg tablet Commonly known as:  Ashlee Jacks Take 1 Tab by mouth three (3) times daily. losartan 25 mg tablet Commonly known as:  COZAAR Take 1 tablet (25 mg) at 6 am.  Take 1/2 tablet (12.5 mg) at 3 pm.  
  
 MYRBETRIQ 25 mg ER tablet Generic drug:  mirabegron ER Take 25 mg by mouth daily. pimavanserin 17 mg Tab Take 2 Tabs by mouth daily. SINEMET  mg per tablet Generic drug:  carbidopa-levodopa Take 1 Tab by mouth five (5) times daily. Patient takes at 6am, 10am, 2pm, 6pm, 2 tabs at bedtime (It is very important for the patient to receive his medication on time or he will experience side effects)  At 6am, patient takes 1 and 1/2 tablet. Indications: Parkinsonism We Performed the Following AMB POC EKG ROUTINE W/ 12 LEADS, INTER & REP [84182 CPT(R)] Follow-up Instructions Return in about 6 months (around 4/13/2018). To-Do List   
 10/13/2017 ECHO:  2D ECHO LIMTED ADULT W OR WO CONTR Patient Instructions Follow up with  6 months or sooner Introducing [facility]! Hesham Johnson introduces [employer] patient portal. Now you can access parts of your medical record, email your doctor's office, and request medication refills online. 1. In your internet browser, go to [email or web address] 2. Click on the First Time User? Click Here link in the Sign In box. You will see the New Member Sign Up page. 3. Enter your [employer] Access Code exactly as it appears below. You will not need to use this code after youve completed the sign-up process. If you do not sign up before the expiration date, you must request a new code. · [employer] Access Code: [email or web address] Expires: 1/7/2018  7:42 AM 
 
4. Enter the last four digits of your Social Security Number (xxxx) and Date of Birth (mm/dd/yyyy) as indicated and click Submit. You will be taken to the next sign-up page. 5. Create a [employer] ID. This will be your [employer] login ID and cannot be changed, so think of one that is secure and easy to remember. 6. Create a [employer] password. You can change your password at any time. 7. Enter your Password Reset Question and Answer. This can be used at a later time if you forget your password. 8. Enter your e-mail address. You will receive e-mail notification when new information is available in [address] 9. Click Sign Up. You can now view and download portions of your medical record. 10. Click the Download Summary menu link to download a portable copy of your medical information. If you have questions, please visit the Frequently Asked Questions section of the [employer] website. Remember, [employer] is NOT to be used for urgent needs. For medical emergencies, dial 911. Now available from your iPhone and Android! Please provide this summary of care documentation to your next provider. Your primary care clinician is listed as Tiff 68. If you have any questions after today's visit, please call [phone].

## 2017-10-20 ENCOUNTER — TELEPHONE (OUTPATIENT)
Dept: CARDIOLOGY CLINIC | Age: 82
End: 2017-10-20

## 2018-02-12 ENCOUNTER — TELEPHONE (OUTPATIENT)
Dept: INTERNAL MEDICINE CLINIC | Age: 83
End: 2018-02-12

## 2018-02-12 NOTE — TELEPHONE ENCOUNTER
Patient's son Eliotcalled stating he's very concerned about his father states he believes his father's  dementia is worsening but not sure if anything else is going on . Patient is scheduled for Thursday but son doesn't feel comfortable waiting that long. Dr Veronica Matthews has a 30 min physical spot open tmrw 02/13/2018 and wed 02/14/2018 but wasn't sure if I could use either one of those spots.        Son can be reached at [phone]

## 2018-02-13 ENCOUNTER — HOSPITAL ENCOUNTER (OUTPATIENT)
Dept: LAB | Age: 83
Discharge: HOME | End: 2018-02-13
Payer: MEDICARE

## 2018-02-13 ENCOUNTER — OFFICE VISIT (OUTPATIENT)
Dept: INTERNAL MEDICINE CLINIC | Age: 83
End: 2018-02-13

## 2018-02-13 VITALS
RESPIRATION RATE: 16 BRPM | WEIGHT: 169 LBS | DIASTOLIC BLOOD PRESSURE: 55 MMHG | TEMPERATURE: 98.1 F | HEIGHT: 71 IN | HEART RATE: 66 BPM | BODY MASS INDEX: 23.66 KG/M2 | OXYGEN SATURATION: 99 % | SYSTOLIC BLOOD PRESSURE: 112 MMHG

## 2018-02-13 DIAGNOSIS — I10 ESSENTIAL HYPERTENSION: ICD-10-CM

## 2018-02-13 DIAGNOSIS — F02.818 DEMENTIA DUE TO PARKINSON'S DISEASE WITH BEHAVIORAL DISTURBANCE (HCC): Primary | ICD-10-CM

## 2018-02-13 DIAGNOSIS — F22 DELUSIONS (HCC): ICD-10-CM

## 2018-02-13 DIAGNOSIS — G20 DEMENTIA DUE TO PARKINSON'S DISEASE WITH BEHAVIORAL DISTURBANCE (HCC): Primary | ICD-10-CM

## 2018-02-13 DIAGNOSIS — S79.911A: ICD-10-CM

## 2018-02-13 DIAGNOSIS — F41.9 ANXIETY: ICD-10-CM
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
## 2018-02-13 PROCEDURE — 80053 COMPREHEN METABOLIC PANEL: CPT

## 2018-02-13 PROCEDURE — 85025 COMPLETE CBC W/AUTO DIFF WBC: CPT

## 2018-02-13 PROCEDURE — 87086 URINE CULTURE/COLONY COUNT: CPT

## 2018-02-13 PROCEDURE — 84443 ASSAY THYROID STIM HORMONE: CPT

## 2018-02-13 RX ORDER — BUSPIRONE HYDROCHLORIDE 5 MG/1
5 TABLET ORAL
Qty: 60 TAB | Refills: 1 | Status: SHIPPED | OUTPATIENT
Start: 2018-02-13 | End: 2018-02-21 | Stop reason: ALTCHOICE

## 2018-02-13 RX ORDER — QUETIAPINE FUMARATE 25 MG/1
50 TABLET, FILM COATED ORAL
COMMUNITY
End: 2019-08-19 | Stop reason: DRUGHIGH

## 2018-02-13 NOTE — PROGRESS NOTES
HISTORY OF PRESENT ILLNESS  Miranda Francois is a [de-identified] y.o. male. HPI  Seen with wife, Laveta Bosworth, primarily because of concerns about increasing delusions and what sounds like sundowning. He's been having trouble with both hallucinations and delusions and in the fall Dr. Chiquis Padron added Nuplazid 17 mg daily, however he seemed to be having more trouble with delusions on this medicine, so January 17th he was given a rx for Seroquel 25 mg, half tab to use at night. He used it for three weeks, but then stopped it because they weren't sure it was helping. She notes last Thursday he was up most of the night taking a shower at 2:00 a.m., agitated, scared, thinking he needed to fly to [place]. She had a very hard time calming him down. He did finally sleep well last night back on the Seroquel half tab for just the last three nights. He's not had fevers or chills. He denies sore throat or congestion. Denies abdominal pain. Denies shortness of breath. He did have a fall last week and feels sore on right hip and ribcage, but has had no trouble with walking. Review of Systems   Constitutional: Negative for chills, diaphoresis, fever and weight loss. HENT: Negative for congestion. Respiratory: Negative for cough, shortness of breath and wheezing. Cardiovascular: Negative for chest pain, palpitations, orthopnea, leg swelling and PND. Gastrointestinal: Positive for constipation. Negative for abdominal pain, diarrhea, heartburn, nausea and vomiting. Genitourinary: Negative for dysuria. Musculoskeletal: Positive for falls. Negative for joint pain, myalgias and neck pain. Neurological: Negative for dizziness, weakness and headaches. Psychiatric/Behavioral: Positive for hallucinations and memory loss. Negative for depression and substance abuse. The patient is nervous/anxious and has insomnia. Physical Exam   Constitutional: He appears well-developed and well-nourished. HENT:   Head: Normocephalic. Right Ear: Tympanic membrane, external ear and ear canal normal.   Left Ear: Tympanic membrane, external ear and ear canal normal.   Nose: Nose normal. Right sinus exhibits no maxillary sinus tenderness and no frontal sinus tenderness. Left sinus exhibits no maxillary sinus tenderness and no frontal sinus tenderness. Mouth/Throat: Oropharynx is clear and moist and mucous membranes are normal. No oropharyngeal exudate. Eyes: Conjunctivae are normal. Pupils are equal, round, and reactive to light. Right eye exhibits no discharge. Left eye exhibits no discharge. Neck: Normal range of motion. Neck supple. Cardiovascular: Normal rate, regular rhythm and normal heart sounds. Pulmonary/Chest: Breath sounds normal. No respiratory distress. He has no wheezes. He has no rales. Abdominal: Soft. Bowel sounds are normal. He exhibits no distension and no mass. There is no tenderness. There is no rebound and no guarding. Musculoskeletal:   Tender right lower ribs   Lymphadenopathy:     He has no cervical adenopathy. Neurological: He is alert. Able to move right hip no pain with rom testing  Bruising over right hip    Psychiatric:   Calm pleasant but trouble with expressive aphasia difficulty finding words   Nursing note and vitals reviewed. ASSESSMENT and PLAN  Diagnoses and all orders for this visit:    1. Dementia due to Parkinson's disease with behavioral disturbance ([address]    2. Anxiety  -     busPIRone (BUSPAR) 5 mg tablet; Take 1 Tab by mouth two (2) times daily as needed. 3. Essential hypertension    4. Delusions ([address]  -     CULTURE, URINE  -     CBC WITH AUTOMATED DIFF  -     METABOLIC PANEL, COMPREHENSIVE  -     TSH 3RD GENERATION    5. Soft tissue injury of hip, right, initial encounter    6.  Fall, initial encounter      the following changes in treatment are made: discussed use of seroquel for sundowning and insomnia and delusions reasonable to try qhs  Does have risk of falls although I think benefits of antipsychotic outweigh risks at this time given significant night time delusions  appt in 1mo to eval response  Can also use buspar prn agitation in day  Discussed in detail with wife

## 2018-02-15 DIAGNOSIS — F41.9 ANXIETY: ICD-10-CM

## 2018-02-15 LAB
ALBUMIN SERPL-MCNC: 4 G/DL (ref 3.5–4.7)
ALBUMIN/GLOB SERPL: 2.4 {RATIO} (ref 1.2–2.2)
ALP SERPL-CCNC: 77 IU/L (ref 39–117)
ALT SERPL-CCNC: 4 IU/L (ref 0–44)
AST SERPL-CCNC: 7 IU/L (ref 0–40)
BACTERIA UR CULT: NO GROWTH
BASOPHILS # BLD AUTO: 0 X10E3/UL (ref 0–0.2)
BASOPHILS NFR BLD AUTO: 0 %
BILIRUB SERPL-MCNC: 0.6 MG/DL (ref 0–1.2)
BUN SERPL-MCNC: 19 MG/DL (ref 8–27)
BUN/CREAT SERPL: 20 (ref 10–24)
CALCIUM SERPL-MCNC: 8.9 MG/DL (ref 8.6–10.2)
CHLORIDE SERPL-SCNC: 103 MMOL/L (ref 96–106)
CO2 SERPL-SCNC: 25 MMOL/L (ref 18–29)
CREAT SERPL-MCNC: 0.96 MG/DL (ref 0.76–1.27)
EOSINOPHIL # BLD AUTO: 0 X10E3/UL (ref 0–0.4)
EOSINOPHIL NFR BLD AUTO: 0 %
ERYTHROCYTE [DISTWIDTH] IN BLOOD BY AUTOMATED COUNT: 14.1 % (ref 12.3–15.4)
GFR SERPLBLD CREATININE-BSD FMLA CKD-EPI: 74 ML/MIN/1.73
GFR SERPLBLD CREATININE-BSD FMLA CKD-EPI: 86 ML/MIN/1.73
GLOBULIN SER CALC-MCNC: 1.7 G/DL (ref 1.5–4.5)
GLUCOSE SERPL-MCNC: 108 MG/DL (ref 65–99)
HCT VFR BLD AUTO: 42.1 % (ref 37.5–51)
HGB BLD-MCNC: 14 G/DL (ref 13–17.7)
IMM GRANULOCYTES # BLD: 0 X10E3/UL (ref 0–0.1)
IMM GRANULOCYTES NFR BLD: 0 %
LYMPHOCYTES # BLD AUTO: 1.4 X10E3/UL (ref 0.7–3.1)
LYMPHOCYTES NFR BLD AUTO: 19 %
MCH RBC QN AUTO: 30.5 PG (ref 26.6–33)
MCHC RBC AUTO-ENTMCNC: 33.3 G/DL (ref 31.5–35.7)
MCV RBC AUTO: 92 FL (ref 79–97)
MONOCYTES # BLD AUTO: 0.6 X10E3/UL (ref 0.1–0.9)
MONOCYTES NFR BLD AUTO: 8 %
NEUTROPHILS # BLD AUTO: 5.2 X10E3/UL (ref 1.4–7)
NEUTROPHILS NFR BLD AUTO: 73 %
PLATELET # BLD AUTO: 212 X10E3/UL (ref 150–379)
POTASSIUM SERPL-SCNC: 4.5 MMOL/L (ref 3.5–5.2)
PROT SERPL-MCNC: 5.7 G/DL (ref 6–8.5)
RBC # BLD AUTO: 4.59 X10E6/UL (ref 4.14–5.8)
SODIUM SERPL-SCNC: 143 MMOL/L (ref 134–144)
TSH SERPL DL<=0.005 MIU/L-ACNC: 1.06 UIU/ML (ref 0.45–4.5)
WBC # BLD AUTO: 7.3 X10E3/UL (ref 3.4–10.8)

## 2018-02-15 RX ORDER — BUSPIRONE HYDROCHLORIDE 5 MG/1
5 TABLET ORAL
Qty: 60 TAB | Refills: 1 | Status: CANCELLED | OUTPATIENT
Start: 2018-02-15

## 2018-02-15 NOTE — TELEPHONE ENCOUNTER
There is an interaction between buspirone & avilect that pt is taken and it can cause elevated bp. [facility] would like to know what to do. Call [facility].   [phone]

## 2018-02-15 NOTE — PROGRESS NOTES
Let his wife know urine culture is clear andn white count is normal no sign of underlying infection or metabolic cause of his confusion

## 2018-02-20 ENCOUNTER — TELEPHONE (OUTPATIENT)
Dept: INTERNAL MEDICINE CLINIC | Age: 83
End: 2018-02-20

## 2018-02-20 DIAGNOSIS — F41.9 ANXIETY: Primary | ICD-10-CM

## 2018-02-20 NOTE — TELEPHONE ENCOUNTER
Patient's wife called stating the new medication prescribe , Buspar, has an interaction alter with  Avilect  So the pharmacy refused to fill medication . Patient's wife would like to speak with the nurse to discuss other options.  She can be reached at [phone]

## 2018-02-20 NOTE — TELEPHONE ENCOUNTER
Patient's wife informed they were not able to fill the Buspar b/c of the interaction with Azilect. I advised PCP was under the impression the Azilect was d/c. Mrs. Trinity Patel advised the Azilect was started back up in November by [facility]. He is also on the Seroquel. Is there an alternative to the Buspar the patient can try?

## 2018-02-21 RX ORDER — LORAZEPAM 0.5 MG/1
0.5 TABLET ORAL
Qty: 30 TAB | Refills: 1 | OUTPATIENT
Start: 2018-02-21 | End: 2018-04-03

## 2018-02-21 NOTE — TELEPHONE ENCOUNTER
V/m left for patient's wife notifying the Buspar Rx has been changed to Ativan 0.5 mg to use a bedtime only as needed. Warned this should only be used a last resort. Rx called into pharmacy on file. Office number left if patient or patient's wife has additional questions or concerns.

## 2018-04-03 ENCOUNTER — HOSPITAL ENCOUNTER (OUTPATIENT)
Age: 83
Setting detail: OBSERVATION
Discharge: HOME | End: 2018-04-04
Attending: EMERGENCY MEDICINE | Admitting: INTERNAL MEDICINE
Payer: MEDICARE

## 2018-04-03 ENCOUNTER — APPOINTMENT (OUTPATIENT)
Dept: GENERAL RADIOLOGY | Age: 83
End: 2018-04-03
Attending: EMERGENCY MEDICINE
Payer: MEDICARE

## 2018-04-03 ENCOUNTER — APPOINTMENT (OUTPATIENT)
Dept: CT IMAGING | Age: 83
End: 2018-04-03
Attending: EMERGENCY MEDICINE
Payer: MEDICARE

## 2018-04-03 DIAGNOSIS — R55 SYNCOPE AND COLLAPSE: Primary | ICD-10-CM

## 2018-04-03 DIAGNOSIS — G20 PARKINSON'S DISEASE (HCC): ICD-10-CM

## 2018-04-03 PROBLEM — R53.83 FATIGUE: Status: ACTIVE | Noted: 2018-04-03

## 2018-04-03 LAB
ALBUMIN SERPL-MCNC: 3.5 G/DL (ref 3.5–5)
ALBUMIN/GLOB SERPL: 1.2 {RATIO} (ref 1.1–2.2)
ALP SERPL-CCNC: 93 U/L (ref 45–117)
ALT SERPL-CCNC: 10 U/L (ref 12–78)
ANION GAP SERPL CALC-SCNC: 8 MMOL/L (ref 5–15)
APPEARANCE UR: CLEAR
AST SERPL-CCNC: 22 U/L (ref 15–37)
ATRIAL RATE: 63 BPM
BACTERIA URNS QL MICRO: NEGATIVE /HPF
BASOPHILS # BLD: 0 K/UL (ref 0–0.1)
BASOPHILS NFR BLD: 0 % (ref 0–1)
BILIRUB SERPL-MCNC: 0.7 MG/DL (ref 0.2–1)
BILIRUB UR QL: NEGATIVE
BUN SERPL-MCNC: 19 MG/DL (ref 6–20)
BUN/CREAT SERPL: 17 (ref 12–20)
CALCIUM SERPL-MCNC: 8.6 MG/DL (ref 8.5–10.1)
CALCULATED P AXIS, ECG09: 35 DEGREES
CALCULATED R AXIS, ECG10: 62 DEGREES
CALCULATED T AXIS, ECG11: 6 DEGREES
CHLORIDE SERPL-SCNC: 101 MMOL/L (ref 97–108)
CK MB CFR SERPL CALC: 1 % (ref 0–2.5)
CK MB SERPL-MCNC: 1.9 NG/ML (ref 5–25)
CK SERPL-CCNC: 185 U/L (ref 39–308)
CO2 SERPL-SCNC: 28 MMOL/L (ref 21–32)
COLOR UR: ABNORMAL
CREAT SERPL-MCNC: 1.14 MG/DL (ref 0.7–1.3)
DIAGNOSIS, 93000: NORMAL
DIFFERENTIAL METHOD BLD: NORMAL
EOSINOPHIL # BLD: 0.1 K/UL (ref 0–0.4)
EOSINOPHIL NFR BLD: 1 % (ref 0–7)
EPITH CASTS URNS QL MICRO: ABNORMAL /LPF
ERYTHROCYTE [DISTWIDTH] IN BLOOD BY AUTOMATED COUNT: 12.9 % (ref 11.5–14.5)
GLOBULIN SER CALC-MCNC: 2.9 G/DL (ref 2–4)
GLUCOSE BLD STRIP.AUTO-MCNC: 141 MG/DL (ref 65–100)
GLUCOSE SERPL-MCNC: 111 MG/DL (ref 65–100)
GLUCOSE UR STRIP.AUTO-MCNC: NEGATIVE MG/DL
HCT VFR BLD AUTO: 43.9 % (ref 36.6–50.3)
HGB BLD-MCNC: 15 G/DL (ref 12.1–17)
HGB UR QL STRIP: NEGATIVE
HYALINE CASTS URNS QL MICRO: ABNORMAL /LPF (ref 0–5)
KETONES UR QL STRIP.AUTO: 15 MG/DL
LEUKOCYTE ESTERASE UR QL STRIP.AUTO: NEGATIVE
LYMPHOCYTES # BLD: 1.5 K/UL
LYMPHOCYTES NFR BLD: 19 % (ref 12–49)
MCH RBC QN AUTO: 30.8 PG (ref 26–34)
MCHC RBC AUTO-ENTMCNC: 34.2 G/DL (ref 30–36.5)
MCV RBC AUTO: 90.1 FL (ref 80–99)
MONOCYTES # BLD: 0.6 K/UL (ref 0–1)
MONOCYTES NFR BLD: 9 % (ref 5–13)
NEUTS SEG # BLD: 5.4 K/UL (ref 1.8–8)
NEUTS SEG NFR BLD: 71 % (ref 32–75)
NITRITE UR QL STRIP.AUTO: NEGATIVE
P-R INTERVAL, ECG05: 170 MS
PH UR STRIP: 6.5 [PH] (ref 5–8)
PLATELET # BLD AUTO: 191 K/UL (ref 150–400)
PMV BLD AUTO: 9.2 FL (ref 8.9–12.9)
POTASSIUM SERPL-SCNC: 3.8 MMOL/L (ref 3.5–5.1)
PROT SERPL-MCNC: 6.4 G/DL (ref 6.4–8.2)
PROT UR STRIP-MCNC: NEGATIVE MG/DL
Q-T INTERVAL, ECG07: 424 MS
QRS DURATION, ECG06: 132 MS
QTC CALCULATION (BEZET), ECG08: 433 MS
RBC # BLD AUTO: 4.87 M/UL (ref 4.1–5.7)
RBC #/AREA URNS HPF: ABNORMAL /HPF (ref 0–5)
SERVICE CMNT-IMP: ABNORMAL
SODIUM SERPL-SCNC: 137 MMOL/L (ref 136–145)
SP GR UR REFRACTOMETRY: 1.01 (ref 1–1.03)
TROPONIN I SERPL-MCNC: 0.04 NG/ML
UR CULT HOLD, URHOLD: NORMAL
UROBILINOGEN UR QL STRIP.AUTO: 0.2 EU/DL (ref 0.2–1)
VENTRICULAR RATE, ECG03: 63 BPM
WBC # BLD AUTO: 7.6 K/UL (ref 4.1–11.1)
WBC URNS QL MICRO: ABNORMAL /HPF (ref 0–4)
XXWBCSUS: 0

## 2018-04-03 PROCEDURE — 96361 HYDRATE IV INFUSION ADD-ON: CPT

## 2018-04-03 PROCEDURE — 80053 COMPREHEN METABOLIC PANEL: CPT | Performed by: EMERGENCY MEDICINE

## 2018-04-03 PROCEDURE — 82550 ASSAY OF CK (CPK): CPT | Performed by: EMERGENCY MEDICINE

## 2018-04-03 PROCEDURE — 73502 X-RAY EXAM HIP UNI 2-3 VIEWS: CPT

## 2018-04-03 PROCEDURE — 96360 HYDRATION IV INFUSION INIT: CPT

## 2018-04-03 PROCEDURE — 74011250637 HC RX REV CODE- 250/637: Performed by: INTERNAL MEDICINE

## 2018-04-03 PROCEDURE — 93005 ELECTROCARDIOGRAM TRACING: CPT

## 2018-04-03 PROCEDURE — 77030032490 HC SLV COMPR SCD KNE COVD -B

## 2018-04-03 PROCEDURE — 84484 ASSAY OF TROPONIN QUANT: CPT | Performed by: EMERGENCY MEDICINE

## 2018-04-03 PROCEDURE — 36415 COLL VENOUS BLD VENIPUNCTURE: CPT | Performed by: EMERGENCY MEDICINE

## 2018-04-03 PROCEDURE — 99285 EMERGENCY DEPT VISIT HI MDM: CPT

## 2018-04-03 PROCEDURE — 70450 CT HEAD/BRAIN W/O DYE: CPT

## 2018-04-03 PROCEDURE — 81001 URINALYSIS AUTO W/SCOPE: CPT | Performed by: EMERGENCY MEDICINE

## 2018-04-03 PROCEDURE — 99218 HC RM OBSERVATION: CPT

## 2018-04-03 PROCEDURE — 94762 N-INVAS EAR/PLS OXIMTRY CONT: CPT

## 2018-04-03 PROCEDURE — 74011250636 HC RX REV CODE- 250/636: Performed by: INTERNAL MEDICINE

## 2018-04-03 PROCEDURE — 82962 GLUCOSE BLOOD TEST: CPT

## 2018-04-03 PROCEDURE — 85025 COMPLETE CBC W/AUTO DIFF WBC: CPT | Performed by: EMERGENCY MEDICINE

## 2018-04-03 RX ORDER — DONEPEZIL HYDROCHLORIDE 5 MG/1
10 TABLET, FILM COATED ORAL DAILY
Status: DISCONTINUED | OUTPATIENT
Start: 2018-04-04 | End: 2018-04-04 | Stop reason: HOSPADM

## 2018-04-03 RX ORDER — SODIUM CHLORIDE 0.9 % (FLUSH) 0.9 %
5-10 SYRINGE (ML) INJECTION AS NEEDED
Status: DISCONTINUED | OUTPATIENT
Start: 2018-04-03 | End: 2018-04-04 | Stop reason: HOSPADM

## 2018-04-03 RX ORDER — CARBIDOPA AND LEVODOPA 25; 100 MG/1; MG/1
1.5 TABLET ORAL
Status: DISCONTINUED | OUTPATIENT
Start: 2018-04-03 | End: 2018-04-04 | Stop reason: HOSPADM

## 2018-04-03 RX ORDER — QUETIAPINE FUMARATE 25 MG/1
50 TABLET, FILM COATED ORAL
Status: DISCONTINUED | OUTPATIENT
Start: 2018-04-03 | End: 2018-04-04 | Stop reason: HOSPADM

## 2018-04-03 RX ORDER — ENTACAPONE 200 MG/1
200 TABLET ORAL
Status: DISCONTINUED | OUTPATIENT
Start: 2018-04-03 | End: 2018-04-04 | Stop reason: HOSPADM

## 2018-04-03 RX ORDER — LOSARTAN POTASSIUM 25 MG/1
25 TABLET ORAL DAILY
COMMUNITY
End: 2018-04-04

## 2018-04-03 RX ORDER — ASPIRIN 325 MG
325 TABLET ORAL DAILY
Status: DISCONTINUED | OUTPATIENT
Start: 2018-04-04 | End: 2018-04-04 | Stop reason: HOSPADM

## 2018-04-03 RX ORDER — DOCUSATE SODIUM 100 MG/1
300 CAPSULE, LIQUID FILLED ORAL DAILY
Status: DISCONTINUED | OUTPATIENT
Start: 2018-04-03 | End: 2018-04-04 | Stop reason: HOSPADM

## 2018-04-03 RX ORDER — SODIUM CHLORIDE 0.9 % (FLUSH) 0.9 %
5-10 SYRINGE (ML) INJECTION EVERY 8 HOURS
Status: DISCONTINUED | OUTPATIENT
Start: 2018-04-03 | End: 2018-04-04 | Stop reason: HOSPADM

## 2018-04-03 RX ORDER — NALOXONE HYDROCHLORIDE 0.4 MG/ML
0.4 INJECTION, SOLUTION INTRAMUSCULAR; INTRAVENOUS; SUBCUTANEOUS AS NEEDED
Status: DISCONTINUED | OUTPATIENT
Start: 2018-04-03 | End: 2018-04-04 | Stop reason: HOSPADM

## 2018-04-03 RX ORDER — ENOXAPARIN SODIUM 100 MG/ML
40 INJECTION SUBCUTANEOUS EVERY 24 HOURS
Status: DISCONTINUED | OUTPATIENT
Start: 2018-04-03 | End: 2018-04-04 | Stop reason: HOSPADM

## 2018-04-03 RX ORDER — SODIUM CHLORIDE 9 MG/ML
75 INJECTION, SOLUTION INTRAVENOUS CONTINUOUS
Status: DISCONTINUED | OUTPATIENT
Start: 2018-04-03 | End: 2018-04-04

## 2018-04-03 RX ORDER — POLYETHYLENE GLYCOL 3350 17 G/17G
17 POWDER, FOR SOLUTION ORAL
Status: ON HOLD | COMMUNITY
End: 2020-06-26

## 2018-04-03 RX ORDER — POLYETHYLENE GLYCOL 3350 17 G/17G
17 POWDER, FOR SOLUTION ORAL
Status: DISCONTINUED | OUTPATIENT
Start: 2018-04-03 | End: 2018-04-04 | Stop reason: HOSPADM

## 2018-04-03 RX ORDER — ATORVASTATIN CALCIUM 20 MG/1
80 TABLET, FILM COATED ORAL
Status: DISCONTINUED | OUTPATIENT
Start: 2018-04-03 | End: 2018-04-04 | Stop reason: HOSPADM

## 2018-04-03 RX ORDER — RASAGILINE 0.5 MG/1
1 TABLET ORAL DAILY
Status: DISCONTINUED | OUTPATIENT
Start: 2018-04-04 | End: 2018-04-04 | Stop reason: HOSPADM

## 2018-04-03 RX ORDER — RASAGILINE 1 MG/1
1 TABLET ORAL DAILY
COMMUNITY

## 2018-04-03 RX ADMIN — ENTACAPONE 200 MG: 200 TABLET, FILM COATED ORAL at 18:29

## 2018-04-03 RX ADMIN — Medication 10 ML: at 20:56

## 2018-04-03 RX ADMIN — SODIUM CHLORIDE 75 ML/HR: 900 INJECTION, SOLUTION INTRAVENOUS at 12:41

## 2018-04-03 RX ADMIN — CARBIDOPA AND LEVODOPA 1.5 TABLET: 25; 100 TABLET ORAL at 17:06

## 2018-04-03 RX ADMIN — ATORVASTATIN CALCIUM 80 MG: 20 TABLET, FILM COATED ORAL at 20:53

## 2018-04-03 RX ADMIN — ENTACAPONE 200 MG: 200 TABLET, FILM COATED ORAL at 20:52

## 2018-04-03 RX ADMIN — DOCUSATE SODIUM 300 MG: 100 CAPSULE, LIQUID FILLED ORAL at 17:07

## 2018-04-03 RX ADMIN — QUETIAPINE FUMARATE 50 MG: 25 TABLET ORAL at 20:53

## 2018-04-03 RX ADMIN — CARBIDOPA AND LEVODOPA 1.5 TABLET: 25; 100 TABLET ORAL at 20:52

## 2018-04-03 NOTE — ED NOTES
Timeout performed with Cuca Lopez EMTP with [facility]. Crew verbalizes understanding of patient condition and destination of [facility]. Crew given ED Summary, Facesheet, and EMTALA. Patient stable at time of transport. Transfer Assessment: Patient A&O x4 and in no distress. Physical re-examination reveals no improvement in pts condition with reassessment of vital signs completed at the time of admission transfer and/or discharge.

## 2018-04-03 NOTE — ED TRIAGE NOTES
Pt was wheeled to the treatment area accompanied by his wife. Pt wife states \"we saw his neurologist on the 23rd and he prescribed a new medicine for depression Paxil he started passing out after that. Yesterday I called Dr Shukri Nixon office they said to take him to the ER. \" Pt states \"left hip is hurting. \" Pt appears in no distress at this time.

## 2018-04-03 NOTE — ED NOTES
TRANSFER - OUT REPORT:    Verbal report given to Chaparro Do RN(name) on Kathe Bennett  being transferred to [facility](unit) for routine progression of care       Report consisted of patients Situation, Background, Assessment and   Recommendations(SBAR). Information from the following report(s) SBAR, ED Summary, Intake/Output, MAR, Recent Results and Cardiac Rhythm NSR with right BBB was reviewed with the receiving nurse. Lines:   Peripheral IV 04/03/18 Right Wrist (Active)   Site Assessment Clean, dry, & intact 4/3/2018 11:04 AM   Phlebitis Assessment 0 4/3/2018 11:04 AM   Infiltration Assessment 0 4/3/2018 11:04 AM   Dressing Status Clean, dry, & intact 4/3/2018 11:04 AM   Dressing Type Transparent;Tape 4/3/2018 11:04 AM   Hub Color/Line Status Pink;Patent; Flushed 4/3/2018 11:04 AM   Action Taken Blood drawn 4/3/2018 11:04 AM        Opportunity for questions and clarification was provided.       Patient transported with:   Monitor    NS at 75mL/hr
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
## 2018-04-03 NOTE — PROGRESS NOTES
[facility]: MED RECONCILIATION    Information obtained from: Patient and his wife     Allergies: Beta blocker [beta-blockers (beta-adrenergic blocking agts)] and Iodine    Prior to Admission Medications:     Medication Documentation Review Audit       Reviewed by Dontrell Parada, PHARMD (Pharmacist) on 04/03/18 at 1631         Medication Sig Documenting Provider Last Dose Status Taking? ASPIRIN 325 mg tablet take 325 mg by mouth daily. Historical Provider 4/3/2018 Active Yes             Med Note (Sean Munguia Jan 5, 2016  3:57 PM): Cardiologist recommended the patient take asa 325 due to history of stent per wife      atorvastatin (LIPITOR) 80 mg tablet TAKE ONE TABLET BY MOUTH EVERY EVENING Braulio Shipman MD  Active Yes    carbidopa-levodopa (SINEMET)  mg per tablet Take 1.5 Tabs by mouth six (6) times daily. Please schedule at 0600, 1000, 1400, 1800, 2100 and 0200  (It is very important for the patient to receive his medication on time or he will experience side effects. Please allow 1 hour of window from the scheduled administration time so that patient can take medication 1 hour early or 1 hour late based on symptoms and last administration time)  Indications: Parkinsonism Historical Provider 4/3/2018 Active Yes    docusate sodium (COLACE) 100 mg capsule Take 300 mg by mouth daily. Historical Provider  Active Yes    donepezil (ARICEPT) 10 mg tablet Take 10 mg by mouth daily. Loyda Diehl MD  Active Yes    entacapone (COMTAN) 200 mg tablet Take 1 Tab by mouth six (6) times daily. Please schedule at 0600, 1000, 1400, 1800, 2100 and 0200  (It is very important for the patient to receive his medication on time or he will experience side effects.  Please allow 1 hour of window from the scheduled administration time so that patient can take medication 1 hour early or 1 hour late based on symptoms and last administration time) Historical Provider 4/3/2018 Active Yes             Med Note (Jamel Krill Tue Feb 13, 2018  1:45 PM):        losartan (COZAAR) 25 mg tablet Take 25 mg by mouth daily. Historical Provider 4/1/2018 Active Yes    mirabegron ER (MYRBETRIQ) 25 mg ER tablet Take 25 mg by mouth daily. Historical Provider  Active Yes    polyethylene glycol (MIRALAX) 17 gram packet Take 17 g by mouth daily as needed (Constipation). Historical Provider  Active Yes    QUEtiapine (SEROQUEL) 25 mg tablet Take 50 mg by mouth nightly. Historical Provider  Active Yes    rasagiline (AZILECT) 1 mg tablet Take 1 mg by mouth daily.  Historical Provider 4/3/2018 Active Yes                  [address] [place] [address], PHARMD   Contact: [phone]

## 2018-04-03 NOTE — IP AVS SNAPSHOT
[address] [facility] 104 1007 [facility] 
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 
[phone] Milady Garza MD In 2 weeks  [facility] [address] [facility] [address] [place] [address] 
[phone] Lo Alicae MD On 2018 2:45 [address] [facility] [address] 1007 [facility] 
[phone] Your Scheduled Appointments 2018  2:45 PM EDT TRANSITIONAL CARE MANAGEMENT with Lo Alicea MD  
[facility] [address]) [address] [facility] 1007 [facility]  
[phone] 2018  8:40 AM EDT  
ESTABLISHED PATIENT with Abigail Zurita MD  
[facility] ([address] [place] [address]) [address] [place] [address] [address] [facility] 57  
[phone] Discharge Orders Procedure Order Date Status Priority Quantity Spec Type Associated Dx REFERRAL TO PHYSICAL THERAPY 18 1611 Normal Routine 1 Comments:  Lymphedema clinic for stockings A check garrett indicates which time of day the medication should be taken. My Medications CONTINUE taking these medications Instructions Each Dose to Equal  
 Morning Noon Evening Bedtime  
 aspirin 325 mg tablet Commonly known as:  ASPIRIN Your last dose was:  9:22 am on 4/4  
   
 take 325 mg by mouth daily. 325 mg  
    
  
   
   
   
  
 atorvastatin 80 mg tablet Commonly known as:  LIPITOR Your last dose was:  9 pm 4/3 TAKE ONE TABLET BY MOUTH EVERY EVENING  
     
   
   
   
  
  
 AZILECT 1 mg tablet Generic drug:  rasagiline Your last dose was:  9:27 am on 4/4 Take 1 mg by mouth daily. 1 mg  
    
  
   
   
   
  
 docusate sodium 100 mg capsule Commonly known as:  Uri Pretty Your last dose was:  9:21 am on 4/4 Take 300 mg by mouth daily. 300 mg  
    
  
   
   
   
  
 donepezil 10 mg tablet Commonly known as:  ARICEPT Your last dose was:  9:21 am on 4/4 Take 10 mg by mouth daily. 10 mg  
    
  
   
   
   
  
 entacapone 200 mg tablet Commonly known as:  Jaquelin Dibbles Your last dose was:  1:15 pm on 4/4 Take 1 Tab by mouth six (6) times daily. Please schedule at 0600, 1000, 1400, 1800, 2100 and 0200 (It is very important for the patient to receive his medication on time or he will experience side effects. Please allow 1 hour of window from the scheduled administration time so that patient can take medication 1 hour early or 1 hour late based on symptoms and last administration time) 1 Tab Give at 6 am and 10 am  
   
Give at 2 pm  
   
Give at 6 pm  
   
Give at 9 pm and 2 am  
  
 MIRALAX 17 gram packet Generic drug:  polyethylene glycol Your last dose was:  Resume at discharge Take 17 g by mouth daily as needed (Constipation). 17 g MYRBETRIQ 25 mg ER tablet Generic drug:  mirabegron ER Your last dose was:  9:21 am 4/4 Take 25 mg by mouth daily. 25 mg  
    
  
   
   
   
  
 QUEtiapine 25 mg tablet Commonly known as:  SEROquel Your last dose was:  9 pm on 4/3 Take 50 mg by mouth nightly. 50 mg SINEMET  mg per tablet Generic drug:  carbidopa-levodopa Your last dose was:  1:15 pm  Take 1.5 Tabs by mouth six (6) times daily. Please schedule at 0600, 1000, 1400, 1800, 2100 and 0200 (It is very important for the patient to receive his medication on time or he will experience side effects. Please allow 1 hour of window from the scheduled administration time so that patient can take medication 1 hour early or 1 hour late based on symptoms and last administration time)  Indications: Parkinsonism 1.5 Tab Give at 6 am and 10 am  
   
Give at 2 pm  
   
Give at 6 pm  
   
Give at 9 pm and 2 am  
  
  
STOP taking these medications   
 losartan 25 mg tablet Commonly known as:  COZAAR Discharge Instructions HOSPITALIST DISCHARGE INSTRUCTIONS 
NAME: Deysi Zafar :  1935 MRN:  [id number] Date/Time:  2018 12:42 PM 
 
ADMIT DATE: 4/3/2018 DISCHARGE DATE: 2018 PRINCIPAL DISCHARGE DIAGNOSES: 
Orthostatic hypotension Parkinson's disease MEDICATIONS: 
· It is important that you take the medication exactly as they are prescribed. Note the changes and additions to your medications. Be sure you understand these changes before you are discharged today. · Keep your medication in the bottles provided by the pharmacist and keep a list of the medication names, dosages, and times to be taken in your wallet. · Do not take other medications without consulting your doctor. · Stop taking Paxil Pain Management: per above medications What to do at [facility] Recommended diet:  Regular Diet Recommended activity: Activity as tolerated If you experience any of the following symptoms then please call your primary care physician or return to the emergency room if you cannot get hold of your doctor: 
severe chest pain, shortness of breath, dizziness, passing out or other severe concerning symptoms that brought you to the hospital in the first place Follow Up: Follow-up Information Follow up With Details Comments Contact Info Ross Easton MD On 4/20/2018 [facility] [address] 
[phone] Tenny Bamberger, MD In 2 weeks  [facility] 84 [address] [facility] [address] 
[phone] Singh Garcia MD On 4/6/2018 2:45 [address] 1007 [facility] 
[phone] Information obtained by : 
I understand that if any problems occur once I am at home I am to contact my physician. I understand and acknowledge receipt of the instructions indicated above. Physician's or R.N.'s Signature                                                                  Date/Time Patient or Representative Signature                                                          Date/Time Future Appointments Date Time Provider Kori Cuevas 4/20/2018 8:40 AM Ross Easton  [address] [facility] Transitions of Care [address] offers a voluntary care coordination program to provide high quality service and care to [facility] fee-for-service beneficiaries. [facility] was designed to help you enhance your health and well-being through the following services: ? Transitions of Care  support for individuals who are transitioning from one care setting to another (example: Hospital to home). ?  Chronic and Complex Care Coordination  support for individuals and caregivers of those with serious or chronic illnesses or with more than one chronic (ongoing) condition and those who take a number of different medications. If you meet specific medical criteria, a [facility] [address] may call you directly to coordinate your care with your primary care physician and your other care providers. For questions about the [facility] programs, please, contact your physicians office. For general questions or additional information about Accountable Care Organizations: 
Please visit www.medicare.gov/acos. html or call 1-800-MEDICARE (1-581.542.1315) TTY users should call 3-162.776.1301. [employer] Announcement We are excited to announce that we are making your provider's discharge notes available to you in [employer]. You will see these notes when they are completed and signed by the physician that discharged you from your recent hospital stay. If you have any questions or concerns about any information you see in [employer], please call the Health Information Department where you were seen or reach out to your Primary Care Provider for more information about your plan of care. Introducing \[facility]\"" & HEALTH SERVICES! [employer] introduces [employer] patient portal. Now you can access parts of your medical record, email your doctor's office, and request medication refills online. 1. In your internet browser, go to [email or web address] 2. Click on the First Time User? Click Here link in the Sign In box. You will see the New Member Sign Up page. 3. Enter your [employer] Access Code exactly as it appears below. You will not need to use this code after youve completed the sign-up process. If you do not sign up before the expiration date, you must request a new code. · [employer] Access Code: [email or web address] Expires: 7/2/2018  7:37 AM 
 
4.  Enter the last four digits of your Social Security Number (xxxx) and Date of Birth (mm/dd/yyyy) as indicated and click Submit. You will be taken to the next sign-up page. 5. Create a [employer] ID. This will be your [employer] login ID and cannot be changed, so think of one that is secure and easy to remember. 6. Create a [employer] password. You can change your password at any time. 7. Enter your Password Reset Question and Answer. This can be used at a later time if you forget your password. 8. Enter your e-mail address. You will receive e-mail notification when new information is available in [address] 9. Click Sign Up. You can now view and download portions of your medical record. 10. Click the Download Summary menu link to download a portable copy of your medical information. If you have questions, please visit the Frequently Asked Questions section of the [employer] website. Remember, [employer] is NOT to be used for urgent needs. For medical emergencies, dial 911. Now available from your iPhone and Android! Introducing Twan Sarah As a [facility] patient, I wanted to make you aware of our electronic visit tool called Twan Allenfin. AirMedia 24/7 allows you to connect within minutes with a medical provider 24 hours a day, seven days a week via a mobile device or tablet or logging into a secure website from your computer. You can access Twan Sarah from anywhere in the [place]. A virtual visit might be right for you when you have a simple condition and feel like you just dont want to get out of bed, or cant get away from work for an appointment, when your regular [facility] provider is not available (evenings, weekends or holidays), or when youre out of town and need minor care. Electronic visits cost only $49 and if the [employer]Media 24/7 provider determines a prescription is needed to treat your condition, one can be electronically transmitted to a nearby pharmacy*. Please take a moment to enroll today if you have not already done so. The enrollment process is free and takes just a few minutes. To enroll, please download the [employer]lusters 24/7 mala to your tablet or phone, or visit [email or web address] to enroll on your computer. And, as an [address] [place] [address] patient with a [employer] account, the results of your visits will be scanned into your electronic medical record and your primary care provider will be able to view the scanned results. We urge you to continue to see your regular [employer] provider for your ongoing medical care. And while your primary care provider may not be the one available when you seek a Media[employer] virtual visit, the peace of mind you get from getting a real diagnosis real time can be priceless. For more information on Media[employer], view our Frequently Asked Questions (FAQs) at [email or web address]. Sincerely, 
 
Nickie Curry MD 
Chief Medical Officer [facility] *:  certain medications cannot be prescribed via Media[employer] Providers Seen During Your Hospitalization Provider Specialty Primary office phone Kavon Montalvo MD Emergency Medicine [phone] Madie Palacios MD Internal Medicine [phone] Your Primary Care Physician (PCP) Primary Care Physician Office Phone Office Fax Eliezer Kaur [phone] You are allergic to the following Allergen Reactions Beta Blocker (Beta-Blockers (Beta-Adrenergic Blocking Agts)) Swelling Other (comments) Confusion, fatigue, and weakness Facial swelling Iodine Swelling Facial swelling Recent Documentation Height Weight BMI Smoking Status 1.778 m 68 kg 21.52 kg/m2 Never Smoker Emergency Contacts Name Discharge Info Relation Home Work Mobile Marichuy Mckeon DISCHARGE CAREGIVER [3] Spouse [3] [phone] Marichuy Rebolledo  Spouse [] [phone] Patient Belongings The following personal items are in your possession at time of discharge: 
  Dental Appliances: None  Visual Aid: Glasses, With patient      Home Medications: None   Jewelry: None  Clothing: At bedside    Other Valuables: Eyeglasses  Personal Items Sent to Safe: none Please provide this summary of care documentation to your next provider. Signatures-by signing, you are acknowledging that this After Visit Summary has been reviewed with you and you have received a copy. Patient Signature:  ____________________________________________________________ Date:  ____________________________________________________________  
  
[facility] Provider Signature:  ____________________________________________________________ Date:  ____________________________________________________________

## 2018-04-03 NOTE — PROGRESS NOTES
Primary Nurse Kimo Upton, RN and Moo Whyte RN performed a dual skin assessment on this patient Impairment noted- see wound doc flow sheet  Jaziel score is 19

## 2018-04-03 NOTE — ED NOTES
Based on TRST score of 3, functional assessment discussed in collaboration with Provider , Family member/representative wife , and Patient. Additional referral to case management is not needed at this time. Wife states \"I have a private care giver for him Monday through Friday. \"
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
## 2018-04-03 NOTE — ED NOTES
Hourly rounding completed. Toileting offered, ongoing plan of care discussed and pts concerns/questions addressed. Pt informed of time factors with lab/imaging study results. Patient updated regarding further care management, including plans for admission to [facility]; patient verbalizes understanding and agreement. Call bell within reach; will continue to monitor.

## 2018-04-03 NOTE — PROGRESS NOTES
I met with the patient and his wife Baron Garcia. Pt's information is correct on demographic sheet. Pt no longer drives, he relies on his wife for transportation. Pt has prescription coverage under his insurance plan. [facility]. Pt's PCP is Dr. Sarah Ware. BLANE Brenda Taty notified of hospital admission. Pt has had home health before but he cannot remember the name of the agency. DME - pt does not have any at this time. Observation letters signed and are on pt's chart. No other issues or concerns at this time. Thanks ADITYA Lindquist   Care Management Interventions  PCP Verified by CM:  Yes  MyChart Signup: No  Discharge Durable Medical Equipment: No  Physical Therapy Consult: Yes  Occupational Therapy Consult: Yes  Speech Therapy Consult: No  Confirm Follow Up Transport: Family  Plan discussed with Pt/Family/Caregiver: Yes  Discharge Location  Discharge Placement: Home

## 2018-04-03 NOTE — ED NOTES
IV fluids infusing via pump without difficulty as ordered. Patient updated regarding admission status and time constraints surrounding the process; patient and family verbalize understanding and agreement. All questions and requests addressed. Call bell in reach. Will continue to monitor.

## 2018-04-03 NOTE — ED NOTES
IV access established and blood samples obtained and sent to lab for ordered testing. EKG obtained. Patient tolerated well. Patient and family updated regarding plan of care and associated time constraints. Patient verbalizes understanding and agreement. Call bell in reach. Will continue to monitor.

## 2018-04-03 NOTE — PROGRESS NOTES
Bedside and Verbal shift change report given to 2050 Starla Russell (oncoming nurse) by Israel Garza (offgoing nurse). Report included the following information SBAR, Kardex, Procedure Summary, Intake/Output, MAR, Accordion and Recent Results.

## 2018-04-03 NOTE — IP AVS SNAPSHOT
[address] 
[phone] Patient: Shar Byrd MRN: [id number] :1935 A check garrett indicates which time of day the medication should be taken. My Medications CONTINUE taking these medications Instructions Each Dose to Equal  
 Morning Noon Evening Bedtime  
 aspirin 325 mg tablet Commonly known as:  ASPIRIN Your last dose was:  9:22 am on   
   
 take 325 mg by mouth daily. 325 mg  
    
  
   
   
   
  
 atorvastatin 80 mg tablet Commonly known as:  LIPITOR Your last dose was:  9 pm 4/3 TAKE ONE TABLET BY MOUTH EVERY EVENING  
     
   
   
   
  
  
 AZILECT 1 mg tablet Generic drug:  rasagiline Your last dose was:  9:27 am on  Take 1 mg by mouth daily. 1 mg  
    
  
   
   
   
  
 docusate sodium 100 mg capsule Commonly known as:  [place] Petrin Your last dose was:  9:21 am on  Take 300 mg by mouth daily. 300 mg  
    
  
   
   
   
  
 donepezil 10 mg tablet Commonly known as:  ARICEPT Your last dose was:  9:21 am on  Take 10 mg by mouth daily. 10 mg  
    
  
   
   
   
  
 entacapone 200 mg tablet Commonly known as:  Brayan Brawn Your last dose was:  1:15 pm on  Take 1 Tab by mouth six (6) times daily. Please schedule at 0600, 1000, 1400, 1800, 2100 and 0200 (It is very important for the patient to receive his medication on time or he will experience side effects. Please allow 1 hour of window from the scheduled administration time so that patient can take medication 1 hour early or 1 hour late based on symptoms and last administration time) 1 Tab Give at 6 am and 10 am  
   
Give at 2 pm  
   
Give at 6 pm  
   
Give at 9 pm and 2 am  
  
 MIRALAX 17 gram packet Generic drug:  polyethylene glycol Your last dose was:  Resume at discharge Take 17 g by mouth daily as needed (Constipation). 17 g MYRBETRIQ 25 mg ER tablet Generic drug:  mirabegron ER Your last dose was:  9:21 am 4/4 Take 25 mg by mouth daily. 25 mg  
    
  
   
   
   
  
 QUEtiapine 25 mg tablet Commonly known as:  SEROquel Your last dose was:  9 pm on 4/3 Take 50 mg by mouth nightly. 50 mg  
    
   
   
   
  
  
 SINEMET  mg per tablet Generic drug:  carbidopa-levodopa Your last dose was:  1:15 pm 4/4 Take 1.5 Tabs by mouth six (6) times daily. Please schedule at 0600, 1000, 1400, 1800, 2100 and 0200 (It is very important for the patient to receive his medication on time or he will experience side effects. Please allow 1 hour of window from the scheduled administration time so that patient can take medication 1 hour early or 1 hour late based on symptoms and last administration time)  Indications: Parkinsonism 1.5 Tab Give at 6 am and 10 am  
   
Give at 2 pm  
   
Give at 6 pm  
   
Give at 9 pm and 2 am  
  
  
STOP taking these medications   
 losartan 25 mg tablet Commonly known as:  COZAAR

## 2018-04-03 NOTE — PROGRESS NOTES
4/3/16  Pt and wife have been instructed to save urine to measure for output. Wife has emptied urinal several times. Instructed we will be measuring for intake and output, pt has a hard time urinating laying down. Rayray Connor

## 2018-04-03 NOTE — ED NOTES
Patient up to standing position with one assist to use urinal as requested. Patient voided 250mL concentrated urine; orange color. Patient tolerated movement well.   States \"I just feel floaty when I stand up, but that is normal for me\"

## 2018-04-04 VITALS
WEIGHT: 150 LBS | TEMPERATURE: 97.8 F | SYSTOLIC BLOOD PRESSURE: 160 MMHG | RESPIRATION RATE: 17 BRPM | HEIGHT: 70 IN | BODY MASS INDEX: 21.47 KG/M2 | OXYGEN SATURATION: 97 % | DIASTOLIC BLOOD PRESSURE: 80 MMHG | HEART RATE: 60 BPM

## 2018-04-04 LAB
ALBUMIN SERPL-MCNC: 3 G/DL (ref 3.5–5)
ALBUMIN/GLOB SERPL: 1.3 {RATIO} (ref 1.1–2.2)
ALP SERPL-CCNC: 77 U/L (ref 45–117)
ALT SERPL-CCNC: 10 U/L (ref 12–78)
ANION GAP SERPL CALC-SCNC: 6 MMOL/L (ref 5–15)
AST SERPL-CCNC: 16 U/L (ref 15–37)
ATRIAL RATE: 57 BPM
BASOPHILS # BLD: 0 K/UL (ref 0–0.1)
BASOPHILS NFR BLD: 1 % (ref 0–1)
BILIRUB SERPL-MCNC: 0.6 MG/DL (ref 0.2–1)
BUN SERPL-MCNC: 20 MG/DL (ref 6–20)
BUN/CREAT SERPL: 21 (ref 12–20)
CALCIUM SERPL-MCNC: 8.2 MG/DL (ref 8.5–10.1)
CALCULATED P AXIS, ECG09: 31 DEGREES
CALCULATED R AXIS, ECG10: 55 DEGREES
CALCULATED T AXIS, ECG11: -2 DEGREES
CHLORIDE SERPL-SCNC: 105 MMOL/L (ref 97–108)
CO2 SERPL-SCNC: 27 MMOL/L (ref 21–32)
CREAT SERPL-MCNC: 0.96 MG/DL (ref 0.7–1.3)
DIAGNOSIS, 93000: NORMAL
DIFFERENTIAL METHOD BLD: ABNORMAL
EOSINOPHIL # BLD: 0.1 K/UL (ref 0–0.4)
EOSINOPHIL NFR BLD: 1 % (ref 0–7)
ERYTHROCYTE [DISTWIDTH] IN BLOOD BY AUTOMATED COUNT: 12.9 % (ref 11.5–14.5)
GLOBULIN SER CALC-MCNC: 2.4 G/DL (ref 2–4)
GLUCOSE SERPL-MCNC: 97 MG/DL (ref 65–100)
HCT VFR BLD AUTO: 38.7 % (ref 36.6–50.3)
HGB BLD-MCNC: 13.2 G/DL (ref 12.1–17)
IMM GRANULOCYTES # BLD: 0 K/UL (ref 0–0.04)
IMM GRANULOCYTES NFR BLD AUTO: 1 % (ref 0–0.5)
LYMPHOCYTES # BLD: 1.7 K/UL (ref 0.8–3.5)
LYMPHOCYTES NFR BLD: 28 % (ref 12–49)
MAGNESIUM SERPL-MCNC: 2.1 MG/DL (ref 1.6–2.4)
MCH RBC QN AUTO: 31 PG (ref 26–34)
MCHC RBC AUTO-ENTMCNC: 34.1 G/DL (ref 30–36.5)
MCV RBC AUTO: 90.8 FL (ref 80–99)
MONOCYTES # BLD: 0.7 K/UL (ref 0–1)
MONOCYTES NFR BLD: 11 % (ref 5–13)
NEUTS SEG # BLD: 3.6 K/UL (ref 1.8–8)
NEUTS SEG NFR BLD: 60 % (ref 32–75)
NRBC # BLD: 0 K/UL (ref 0–0.01)
NRBC BLD-RTO: 0 PER 100 WBC
P-R INTERVAL, ECG05: 174 MS
PLATELET # BLD AUTO: 177 K/UL (ref 150–400)
PMV BLD AUTO: 9.1 FL (ref 8.9–12.9)
POTASSIUM SERPL-SCNC: 3.9 MMOL/L (ref 3.5–5.1)
PROT SERPL-MCNC: 5.4 G/DL (ref 6.4–8.2)
Q-T INTERVAL, ECG07: 432 MS
QRS DURATION, ECG06: 136 MS
QTC CALCULATION (BEZET), ECG08: 420 MS
RBC # BLD AUTO: 4.26 M/UL (ref 4.1–5.7)
SODIUM SERPL-SCNC: 138 MMOL/L (ref 136–145)
VENTRICULAR RATE, ECG03: 57 BPM
WBC # BLD AUTO: 6 K/UL (ref 4.1–11.1)

## 2018-04-04 PROCEDURE — 83735 ASSAY OF MAGNESIUM: CPT | Performed by: INTERNAL MEDICINE

## 2018-04-04 PROCEDURE — 99218 HC RM OBSERVATION: CPT

## 2018-04-04 PROCEDURE — 85025 COMPLETE CBC W/AUTO DIFF WBC: CPT | Performed by: INTERNAL MEDICINE

## 2018-04-04 PROCEDURE — 96361 HYDRATE IV INFUSION ADD-ON: CPT

## 2018-04-04 PROCEDURE — 93005 ELECTROCARDIOGRAM TRACING: CPT

## 2018-04-04 PROCEDURE — G8980 MOBILITY D/C STATUS: HCPCS

## 2018-04-04 PROCEDURE — 77030012893

## 2018-04-04 PROCEDURE — 97530 THERAPEUTIC ACTIVITIES: CPT

## 2018-04-04 PROCEDURE — 97162 PT EVAL MOD COMPLEX 30 MIN: CPT

## 2018-04-04 PROCEDURE — G8979 MOBILITY GOAL STATUS: HCPCS

## 2018-04-04 PROCEDURE — 74011250637 HC RX REV CODE- 250/637: Performed by: INTERNAL MEDICINE

## 2018-04-04 PROCEDURE — 80053 COMPREHEN METABOLIC PANEL: CPT | Performed by: INTERNAL MEDICINE

## 2018-04-04 PROCEDURE — 97116 GAIT TRAINING THERAPY: CPT

## 2018-04-04 PROCEDURE — 36415 COLL VENOUS BLD VENIPUNCTURE: CPT | Performed by: INTERNAL MEDICINE

## 2018-04-04 PROCEDURE — G8978 MOBILITY CURRENT STATUS: HCPCS

## 2018-04-04 PROCEDURE — 74011250636 HC RX REV CODE- 250/636: Performed by: INTERNAL MEDICINE

## 2018-04-04 RX ADMIN — ENTACAPONE 200 MG: 200 TABLET, FILM COATED ORAL at 01:21

## 2018-04-04 RX ADMIN — MIRABEGRON 25 MG: 25 TABLET, FILM COATED, EXTENDED RELEASE ORAL at 09:21

## 2018-04-04 RX ADMIN — CARBIDOPA AND LEVODOPA 1.5 TABLET: 25; 100 TABLET ORAL at 01:21

## 2018-04-04 RX ADMIN — ENTACAPONE 200 MG: 200 TABLET, FILM COATED ORAL at 17:01

## 2018-04-04 RX ADMIN — ENTACAPONE 200 MG: 200 TABLET, FILM COATED ORAL at 09:22

## 2018-04-04 RX ADMIN — DOCUSATE SODIUM 300 MG: 100 CAPSULE, LIQUID FILLED ORAL at 09:21

## 2018-04-04 RX ADMIN — RASAGILINE 1 MG: 0.5 TABLET ORAL at 09:27

## 2018-04-04 RX ADMIN — ASPIRIN 325 MG: 325 TABLET ORAL at 09:22

## 2018-04-04 RX ADMIN — ENTACAPONE 200 MG: 200 TABLET, FILM COATED ORAL at 05:38

## 2018-04-04 RX ADMIN — CARBIDOPA AND LEVODOPA 1.5 TABLET: 25; 100 TABLET ORAL at 13:15

## 2018-04-04 RX ADMIN — ENTACAPONE 200 MG: 200 TABLET, FILM COATED ORAL at 13:15

## 2018-04-04 RX ADMIN — DONEPEZIL HYDROCHLORIDE 10 MG: 5 TABLET, FILM COATED ORAL at 09:21

## 2018-04-04 RX ADMIN — CARBIDOPA AND LEVODOPA 1.5 TABLET: 25; 100 TABLET ORAL at 09:21

## 2018-04-04 RX ADMIN — CARBIDOPA AND LEVODOPA 1.5 TABLET: 25; 100 TABLET ORAL at 17:01

## 2018-04-04 RX ADMIN — Medication 10 ML: at 13:16

## 2018-04-04 RX ADMIN — SODIUM CHLORIDE 75 ML/HR: 900 INJECTION, SOLUTION INTRAVENOUS at 02:17

## 2018-04-04 RX ADMIN — CARBIDOPA AND LEVODOPA 1.5 TABLET: 25; 100 TABLET ORAL at 05:38

## 2018-04-04 RX ADMIN — Medication 10 ML: at 05:38

## 2018-04-04 NOTE — PROGRESS NOTES
1145: Patient bp elevated 190/88. Notified Dr. Zachery Omalley. Will continue to monitor    1400: Notified by tele tech patient HR 160s. Nonsustained. Looks like artifact. Upon assessment HR now NSR 70s. Patient reports being tremourous. Does report SOB and palpitations during these times. Notified Dr. Zachery Omalley. Orders to get EKG and notify cardiology. EKG complete. Jasmin notified. 1615: Spoke to Dr. Soundra Galeazzi. Patient is cleared by neuro. PT worked with patient. Patient is cleared. Patient did drop 22 points systolic but asymptomatic. Recommending followup at lymphedema clinic. Notified Dr. Zachery Omalley. Patient OK to discharge. [place] not at hospital. Have patient follow up with Dr. Warren Sharpe for script for lymphedema stockings. Dr. Soundra Galeazzi on floor. Notified of need for script. Will write script. Patient given compression stockings until able to follow up at clinic.
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
## 2018-04-04 NOTE — DISCHARGE INSTRUCTIONS
HOSPITALIST DISCHARGE INSTRUCTIONS  NAME: Brenna Travis   :  1935   MRN:  [id number]     Date/Time:  2018 12:42 PM    ADMIT DATE: 4/3/2018     DISCHARGE DATE: 2018     PRINCIPAL DISCHARGE DIAGNOSES:  Orthostatic hypotension  Parkinson's disease    MEDICATIONS:  · It is important that you take the medication exactly as they are prescribed. Note the changes and additions to your medications. Be sure you understand these changes before you are discharged today. · Keep your medication in the bottles provided by the pharmacist and keep a list of the medication names, dosages, and times to be taken in your wallet. · Do not take other medications without consulting your doctor. · Stop taking Paxil    Pain Management: per above medications    What to do at Home    Recommended diet:  Regular Diet    Recommended activity: Activity as tolerated    If you experience any of the following symptoms then please call your primary care physician or return to the emergency room if you cannot get hold of your doctor:  severe chest pain, shortness of breath, dizziness, passing out or other severe concerning symptoms that brought you to the hospital in the first place    Follow Up: Follow-up Information     Follow up With Details Comments 607 Jess Dugan MD On 2018 [address]      Dottie Verdin MD In 2 weeks  [address]  [facility] [address] [address], MD On 2018 2:45 [address]  [address]  [address]  [phone]              Information obtained by :  I understand that if any problems occur once I am at home I am to contact my physician. I understand and acknowledge receipt of the instructions indicated above. [de-identified] or R.N.'s Signature                                                                  Date/Time                                                                                                                                              Patient or Representative Signature                                                          Date/Time      Future Appointments  Date Time Provider Kori Cuevas   4/20/2018 8:40 AM Melvin Anderson  [address]
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
## 2018-04-04 NOTE — PROGRESS NOTES
Discharge instructions were reviewed with patient and his wife. All questions were answered. IV and heart monitor were removed. Patient received a copy of his discharge papers and will be discharge home with his wife. Patient was given a referral to the Lymphedema Clinic.

## 2018-04-04 NOTE — DISCHARGE SUMMARY
Physician Discharge Summary     Patient ID:  Alysa Shipley  [id number]  14 y.o.  1935    Admit date: 4/3/2018    Discharge date: 4/4/2018    Admission Diagnoses: Syncope    Principal Discharge Diagnoses:    Orthostatic hypotension    OTHER PROBLEMS ADDRESSEDS  Principal Diagnosis <principal problem not specified>                                            Active Problems:    Coronary atherosclerosis of native coronary artery ()      Hypertension ()      Parkinson's disease ([facility] [address] (5/9/2011)      Overview: Dr Zander Nino      GERD (gastroesophageal reflux disease) (4/26/2012)      HLD (hyperlipidemia) (1/5/2016)      Dementia (1/7/2016)      Overview: - per Neuro-psyc eval 1/6/16      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 ([facility] [address] (1/7/2016)      Syncope (4/3/2018)      Fatigue (4/3/2018)       Patient Active Problem List   Diagnosis Code    Edema R60.9    Coronary atherosclerosis of native coronary artery I25.10    Nonspecific abnormal electrocardiogram (ECG) (EKG) R94.31    Other malaise and fatigue R53.81, R53.83    Hypertension I10    Chest pain, unspecified R07.9    Other and unspecified hyperlipidemia E78.5    Parkinson's disease ([facility] [address] G20    GERD (gastroesophageal reflux disease) K21.9    HLD (hyperlipidemia) E78.5    Dementia F03.90    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 Likely from orthostatic hypotension as this was markedly abnormal in the ED. Complicated by Parkinson's disease. SSRIs known to cause or exacerbate orthostatic hypotension and pt was started on Paxil one week PTA. Stopped SSRI. ARB on hold. Would allow permissive hypertension with goal SBP in the 150s. Pt and wife to continue checking BP at home, twice daily. If SBP consistently >180, then resume losartan.  Re-trial of compression stocking, liberalizing diet with salt intake, and staying well hydrated (pt admits to not drinking enough fluids). If stable with PT/OT today, will plan on discharging home with close outpatient follow up.      Parkinson's disease: resting tremors, cogwheel rigidity. Use Seroquel (home med) with caution        Coronary atherosclerosis of native coronary artery: no CP or SOB. Continue ASA, statin. Not on BB       Hypertension (): holding ARB, given orthostatic hypotension. 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 At risk for delirium. NO HALDOL.        Weakness: PT / OT, fall precautions. Pt has care provider at home. Wife and pt understand high fall risk.        Pt discharged in improved and stable condition. Procedures performed: see above    Imaging studies:   head CT  1. No evidence of acute infarct or intracranial hemorrhage. 2. Mild periventricular white matter disease is likely secondary to chronic  small vessel ischemic changes. L hip XRAY  IMPRESSION:  No acute abnormality. PCP: Santos Alston MD    Consults: Cardiology and Neurology    Discharge Exam:  Patient Vitals for the past 12 hrs:   Temp Pulse Resp BP SpO2   04/04/18 1222 - (P) 60 - (P) 160/80 -   04/04/18 1121 97.8 °F (36.6 °C) 61 17 190/88 97 %   04/04/18 0920 98.3 °F (36.8 °C) 61 14 151/73 97 %   04/04/18 0700 - 63 - - -   04/04/18 0637 - (!) 59 - 148/72 -   04/04/18 0537 97.9 °F (36.6 °C) 61 12 155/81 96 %     GEN: NAD  CV: RRR  RESP: CTAB  NEURO: resting tremors, generalized weakness without focal findings. A&O x 4        Disposition: home    Patient Instructions:   Current Discharge Medication List      CONTINUE these medications which have NOT CHANGED    Details   rasagiline (AZILECT) 1 mg tablet Take 1 mg by mouth daily.       polyethylene glycol (MIRALAX) 17 gram packet Take 17 g by mouth daily as needed (Constipation). QUEtiapine (SEROQUEL) 25 mg tablet Take 50 mg by mouth nightly. atorvastatin (LIPITOR) 80 mg tablet TAKE ONE TABLET BY MOUTH EVERY EVENING  Qty: 90 Tab, Refills: 2      docusate sodium (COLACE) 100 mg capsule Take 300 mg by mouth daily. entacapone (COMTAN) 200 mg tablet Take 1 Tab by mouth six (6) times daily. Please schedule at 0600, 1000, 1400, 1800, 2100 and 0200  (It is very important for the patient to receive his medication on time or he will experience side effects. Please allow 1 hour of window from the scheduled administration time so that patient can take medication 1 hour early or 1 hour late based on symptoms and last administration time)      mirabegron ER (MYRBETRIQ) 25 mg ER tablet Take 25 mg by mouth daily. donepezil (ARICEPT) 10 mg tablet Take 10 mg by mouth daily. carbidopa-levodopa (SINEMET)  mg per tablet Take 1.5 Tabs by mouth six (6) times daily. Please schedule at 0600, 1000, 1400, 1800, 2100 and 0200  (It is very important for the patient to receive his medication on time or he will experience side effects. Please allow 1 hour of window from the scheduled administration time so that patient can take medication 1 hour early or 1 hour late based on symptoms and last administration time)  Indications: Parkinsonism      ASPIRIN 325 mg tablet take 325 mg by mouth daily.          STOP taking these medications       losartan (COZAAR) 25 mg tablet Comments:   Reason for Stopping:               Activity: See discharge instructions  Diet: See discharge instructions  Wound Care: See discharge instructions    Follow-up Information     Follow up With Details Comments [address] Jess Dugan MD On 4/20/2018 [address]      Margarita Hashimoto, MD In 2 weeks  [facility] [address] Liliam Church MD On 4/6/2018 2:45 [address] [facility] [place]  [address] [place]  [phone]            I spent 35 minutes on this discharge.     Signed:  Hardik Pineda MD  4/4/2018  12:43 PM

## 2018-04-04 NOTE — PROGRESS NOTES
Orthostatics positive again this morning with systolic blood pressure dropping from 122/66 to 87/51 from sitting to standing. Was not symptomatic. BP up now to 190/88. Will have PT / OT evaluate patient. Would allow permissive hypertension with goal SBP in the 150s. Hold Losartan. Pt and wife to continue checking BP at home, twice daily. If SBP consistently >180, then resume losartan. Also discussed re-trial of compression stocking, liberalizing diet with salt intake, and staying well hydrated (pt admits to not drinking enough fluids). If stable with PT/OT today, will plan on discharging home with close outpatient follow up. Appreciate neurology evaluation as well.        Casey Baires MD

## 2018-04-04 NOTE — PROGRESS NOTES
Pt is being dc today. -[address] notified of discharge. Pt will be transported by his family.  I made the patient an appointment with his PCP, information is on the AVS. Thanks ADITYA Garcia

## 2018-04-04 NOTE — ADVANCED PRACTICE NURSE
Notified by RN of concern for HR on tele. Review of telemetry appears to be artifact, (he does have freq parkinsonian tremors). ECG obtained shows SB with RBBB, no change from admission .

## 2018-04-04 NOTE — PROGRESS NOTES
Bedside and Verbal shift change report given to Joanna Marquez RN (oncoming nurse) by Meri Harris RN (offgoing nurse). Report included the following information SBAR, Kardex, Intake/Output, MAR, Recent Results and Cardiac Rhythm SR w/ BBB. Problem: Falls - Risk of  Goal: *Absence of Falls  Document Beny Fall Risk and appropriate interventions in the flowsheet.    Outcome: Progressing Towards Goal  Fall Risk Interventions:  Mobility Interventions: Bed/chair exit alarm, Patient to call before getting OOB    Mentation Interventions: Adequate sleep, hydration, pain control, Bed/chair exit alarm, Door open when patient unattended, Familiar objects from home, Family/sitter at bedside    Medication Interventions: Bed/chair exit alarm, Patient to call before getting OOB, Teach patient to arise slowly    Elimination Interventions: Bed/chair exit alarm, Call light in reach, Toilet paper/wipes in reach, Toileting schedule/hourly rounds    History of Falls Interventions: Bed/chair exit alarm, Door open when patient unattended, Investigate reason for fall, Room close to nurse's station

## 2018-04-04 NOTE — PROGRESS NOTES
Pharmacist Discharge Medication Reconciliation    Discharge Provider:  Dr. Quirino Figueroa      Discharge Medications:      My Medications        CONTINUE taking these medications         Instructions Each Dose to Equal   Morning Noon Evening Bedtime      aspirin 325 mg tablet   Commonly known as:  ASPIRIN   Your last dose was:  9:22 am on 4/4        take 325 mg by mouth daily. 325 mg                           atorvastatin 80 mg tablet   Commonly known as:  LIPITOR   Your last dose was:  9 pm 4/3        TAKE ONE TABLET BY MOUTH EVERY EVENING                            AZILECT 1 mg tablet   Generic drug:  rasagiline   Your last dose was:  9:27 am on 4/4        Take 1 mg by mouth daily. 1 mg                           docusate sodium 100 mg capsule   Commonly known as:  COLACE   Your last dose was:  9:21 am on 4/4        Take 300 mg by mouth daily. 300 mg                           donepezil 10 mg tablet   Commonly known as:  ARICEPT   Your last dose was:  9:21 am on 4/4        Take 10 mg by mouth daily. 10 mg                           entacapone 200 mg tablet   Commonly known as:  COMTAN   Your last dose was:  1:15 pm on 4/4        Take 1 Tab by mouth six (6) times daily. Please schedule at 0600, 1000, 1400, 1800, 2100 and 0200 (It is very important for the patient to receive his medication on time or he will experience side effects. Please allow 1 hour of window from the scheduled administration time so that patient can take medication 1 hour early or 1 hour late based on symptoms and last administration time)    1 Tab        Give at 6 am and 10 am       Give at 2 pm       Give at 6 pm       Give at 9 pm and 2 am       MIRALAX 17 gram packet   Generic drug:  polyethylene glycol   Your last dose was:  Resume at discharge        Take 17 g by mouth daily as needed (Constipation).     17 g                        MYRBETRIQ 25 mg ER tablet   Generic drug:  mirabegron ER   Your last dose was:  9:21 am 4/4        Take 25 mg by mouth daily. 25 mg                           QUEtiapine 25 mg tablet   Commonly known as:  SEROquel   Your last dose was:  9 pm on 4/3        Take 50 mg by mouth nightly. 50 mg                           SINEMET  mg per tablet   Generic drug:  carbidopa-levodopa   Your last dose was:  1:15 pm 4/4        Take 1.5 Tabs by mouth six (6) times daily. Please schedule at 0600, 1000, 1400, 1800, 2100 and 0200 (It is very important for the patient to receive his medication on time or he will experience side effects.  Please allow 1 hour of window from the scheduled administration time so that patient can take medication 1 hour early or 1 hour late based on symptoms and last administration time)  Indications: Parkinsonism    1.5 Tab        Give at 6 am and 10 am       Give at 2 pm       Give at 6 pm       Give at 9 pm and 2 am             STOP taking these medications              losartan 25 mg tablet   Commonly known as:  COZAAR                    The patient's chart, MAR, and AVS were reviewed by   Ashley Hanley, ChelseaD  Contact: [phone]

## 2018-04-06 ENCOUNTER — OFFICE VISIT (OUTPATIENT)
Dept: INTERNAL MEDICINE CLINIC | Age: 83
End: 2018-04-06

## 2018-04-06 VITALS
SYSTOLIC BLOOD PRESSURE: 119 MMHG | DIASTOLIC BLOOD PRESSURE: 45 MMHG | WEIGHT: 158 LBS | HEIGHT: 70 IN | BODY MASS INDEX: 22.62 KG/M2 | OXYGEN SATURATION: 94 % | TEMPERATURE: 97.8 F | RESPIRATION RATE: 18 BRPM | HEART RATE: 52 BPM

## 2018-04-06 DIAGNOSIS — R63.4 WEIGHT LOSS: ICD-10-CM

## 2018-04-06 DIAGNOSIS — G20 DEMENTIA DUE TO PARKINSON'S DISEASE WITH BEHAVIORAL DISTURBANCE (HCC): ICD-10-CM

## 2018-04-06 DIAGNOSIS — F02.818 DEMENTIA DUE TO PARKINSON'S DISEASE WITH BEHAVIORAL DISTURBANCE (HCC): ICD-10-CM

## 2018-04-06 DIAGNOSIS — I95.1 ORTHOSTATIC HYPOTENSION: Primary | ICD-10-CM

## 2018-04-06 DIAGNOSIS — R44.1 HALLUCINATION, VISUAL: ICD-10-CM

## 2018-04-06 NOTE — PROGRESS NOTES
Pt here with wife to follow up from recent hospital admission for syncope. Pt c/o frequent dizziness and episodes of passing out. Would like to discuss BP medication. Pts wife reports pts BP spiked to 172/107 this afternoon. Wife gave pt 12.5 mg of losartan.

## 2018-04-06 NOTE — PROGRESS NOTES
HISTORY OF PRESENT ILLNESS  Drake Rosales is a [de-identified] y.o. male. HPI  Transition of care visit. He had a hospital stay April 3-4 for syncopal spell with orthostatic hypotension and fall. He was advised in the hospital to hold the Losartan 25 mg and only use it prn systolic blood pressures over 150. His wife notes that his blood pressure today got up to 177/107, so she gave him 12.5 mg of Losartan. He then got dizzy and lightheaded and blood pressure was down to 100/60. He does have Parkinson's disease, which unfortunately is progressing. He has lost weight, about 9 lbs since his visit in February. He continues to have visual hallucinations and episodes of agitation, as well as episodes of lucidity. He is working with neurology and on Seroquel, Azilect, Sinemet, and they are trying to manage his symptoms. Review of Systems   Constitutional: Positive for malaise/fatigue and weight loss. Negative for chills and fever. Respiratory: Negative for cough, shortness of breath and wheezing. Cardiovascular: Negative for chest pain, palpitations, orthopnea, leg swelling and PND. Gastrointestinal: Negative for heartburn and nausea. Musculoskeletal: Negative for myalgias. Neurological: Negative for dizziness and headaches. Psychiatric/Behavioral: Positive for hallucinations. The patient does not have insomnia. Physical Exam   Constitutional: He appears well-developed and well-nourished. HENT:   Head: Normocephalic and atraumatic. Neck: Normal range of motion. Neck supple. Carotid bruit is not present. No thyromegaly present. Cardiovascular: Normal rate, regular rhythm, normal heart sounds and intact distal pulses. Pulmonary/Chest: Effort normal and breath sounds normal. No respiratory distress. He has no wheezes. He has no rales. Abdominal: Soft. Bowel sounds are normal. He exhibits no distension. Musculoskeletal: He exhibits no edema. Neurological: He is alert.    Very clear today making jokes   Psychiatric: He has a normal mood and affect. His behavior is normal.   Nursing note and vitals reviewed. ASSESSMENT and PLAN  Diagnoses and all orders for this visit:    1. Orthostatic hypotension    2. Dementia due to Parkinson's disease with behavioral disturbance ([facility] [address]    3. Weight loss    4.  Hallucination, visual      the following changes in treatment are made: stop losartan as risk of hypotension seems to outweigh risk of htn now  Sees cardiology in 3 weeks  Call if bp over 325 systolic

## 2018-04-20 ENCOUNTER — OFFICE VISIT (OUTPATIENT)
Dept: CARDIOLOGY CLINIC | Age: 83
End: 2018-04-20

## 2018-04-20 VITALS
SYSTOLIC BLOOD PRESSURE: 120 MMHG | BODY MASS INDEX: 22.31 KG/M2 | HEART RATE: 61 BPM | DIASTOLIC BLOOD PRESSURE: 70 MMHG | HEIGHT: 71 IN | WEIGHT: 159.4 LBS

## 2018-04-20 DIAGNOSIS — R07.9 CHEST PAIN, UNSPECIFIED TYPE: Primary | ICD-10-CM

## 2018-04-20 DIAGNOSIS — I25.10 ATHEROSCLEROSIS OF NATIVE CORONARY ARTERY OF NATIVE HEART WITHOUT ANGINA PECTORIS: ICD-10-CM

## 2018-04-20 NOTE — MR AVS SNAPSHOT
[address] [facility] 
[phone] Patient: Michael Sal MRN:  :[id number] Visit Information Date & Time Provider Department Dept. Phone Encounter #  
 2018  8:40 AM Trung Burciaga MD [facility] [place] [phone] [id number] Upcoming Health Maintenance Date Due  
 GLAUCOMA SCREENING Q2Y 2002 Influenza Age 5 to Adult 2017 MEDICARE YEARLY EXAM 3/14/2018 DTaP/Tdap/Td series (2 - Td) 2024 Allergies as of 2018  Review Complete On: 2018 By: Kaity Dykes LPN Severity Noted Reaction Type Reactions Beta Blocker [Beta-blockers (Beta-adrenergic Blocking Agts)]  10/13/2010    Swelling, Other (comments) Confusion, fatigue, and weakness Facial swelling Iodine  2009    Swelling Facial swelling Current Immunizations  Reviewed on 2016 Name Date Hep A Vaccine 1995 Hep A and Hep B Vaccine 2014 Hep B Vaccine 1995 Influenza Vaccine 10/1/2015 Pneumococcal Conjugate (PCV-13) 10/29/2015 Pneumococcal Polysaccharide (PPSV-23) 2014 Tdap 2014 Zoster Vaccine, Live 2006 Not reviewed this visit You Were Diagnosed With   
  
 Codes Comments Atherosclerosis of native coronary artery of native heart without angina pectoris    -  Primary ICD-10-CM: I25.10 ICD-9-CM: 414.01 Vitals BP Pulse Height(growth percentile) Weight(growth percentile) BMI Smoking Status 120/70 (BP 1 Location: Left arm, BP Patient Position: Sitting) 61 5' 11\" (1.803 m) 159 lb 6.4 oz (72.3 kg) 22.23 kg/m2 Never Smoker Vitals History BMI and BSA Data Body Mass Index Body Surface Area  
 22.23 kg/m 2 1.9 m 2 Preferred Pharmacy Pharmacy Name Phone [facility], [address] [phone] Your Updated Medication List  
  
   
This list is accurate as of 4/20/18  9:16 AM.  Always use your most recent med list.  
  
  
  
  
 aspirin 325 mg tablet Commonly known as:  ASPIRIN  
take 325 mg by mouth daily. atorvastatin 80 mg tablet Commonly known as:  LIPITOR  
TAKE ONE TABLET BY MOUTH EVERY EVENING  
  
 AZILECT 1 mg tablet Generic drug:  rasagiline Take 1 mg by mouth daily. docusate sodium 100 mg capsule Commonly known as:  Dary Officer Take 300 mg by mouth daily. donepezil 10 mg tablet Commonly known as:  ARICEPT Take 10 mg by mouth daily. entacapone 200 mg tablet Commonly known as:  Dianelys Calender Take 1 Tab by mouth six (6) times daily. Please schedule at 0600, 1000, 1400, 1800, 2100 and 0200 (It is very important for the patient to receive his medication on time or he will experience side effects. Please allow 1 hour of window from the scheduled administration time so that patient can take medication 1 hour early or 1 hour late based on symptoms and last administration time) MIRALAX 17 gram packet Generic drug:  polyethylene glycol Take 17 g by mouth daily as needed (Constipation). MYRBETRIQ 25 mg ER tablet Generic drug:  mirabegron ER Take 25 mg by mouth daily. QUEtiapine 25 mg tablet Commonly known as:  SEROquel Take 50 mg by mouth nightly. SINEMET  mg per tablet Generic drug:  carbidopa-levodopa Take 1.5 Tabs by mouth six (6) times daily. Please schedule at 0600, 1000, 1400, 1800, 2100 and 0200 (It is very important for the patient to receive his medication on time or he will experience side effects. Please allow 1 hour of window from the scheduled administration time so that patient can take medication 1 hour early or 1 hour late based on symptoms and last administration time)  Indications: Parkinsonism We Performed the Following AMB POC EKG ROUTINE W/ 12 LEADS, INTER & REP [41197 CPT(R)] To-Do List   
 05/02/2018 12:30 PM  
  Appointment with Demetria Silver DPJULIANE at [address] [facility] ([phone]) Patient Instructions Losartan 25 mg daily as needed only Call if BP greater or equal 170 Jace Bosch and see [facility] after test 
 
 
  
Introducing [facility]! Carly Bardales introduces [employer] patient portal. Now you can access parts of your medical record, email your doctor's office, and request medication refills online. 1. In your internet browser, go to [email or web address] 2. Click on the First Time User? Click Here link in the Sign In box. You will see the New Member Sign Up page. 3. Enter your [employer] Access Code exactly as it appears below. You will not need to use this code after youve completed the sign-up process. If you do not sign up before the expiration date, you must request a new code. · [employer] Access Code: [email or web address] Expires: 7/2/2018  7:37 AM 
 
4. Enter the last four digits of your Social Security Number (xxxx) and Date of Birth (mm/dd/yyyy) as indicated and click Submit. You will be taken to the next sign-up page. 5. Create a [employer]inc ID. This will be your [employer] login ID and cannot be changed, so think of one that is secure and easy to remember. 6. Create a [employer] password. You can change your password at any time. 7. Enter your Password Reset Question and Answer. This can be used at a later time if you forget your password. 8. Enter your e-mail address. You will receive e-mail notification when new information is available in [address] 9. Click Sign Up. You can now view and download portions of your medical record. 10. Click the Download Summary menu link to download a portable copy of your medical information. If you have questions, please visit the Frequently Asked Questions section of the [employer] website. Remember, [employer] is NOT to be used for urgent needs. For medical emergencies, dial 911. Now available from your iPhone and Android! Please provide this summary of care documentation to your next provider. Your primary care clinician is listed as Tiff 68. If you have any questions after today's visit, please call [phone].
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
## 2018-04-20 NOTE — PATIENT INSTRUCTIONS
Losartan 25 mg daily as needed only    Call if BP greater or equal 170    Lexiscan myoview and see Korey Doll after test

## 2018-05-02 ENCOUNTER — APPOINTMENT (OUTPATIENT)
Dept: PHYSICAL THERAPY | Age: 83
End: 2018-05-02

## 2018-05-04 ENCOUNTER — CLINICAL SUPPORT (OUTPATIENT)
Dept: CARDIOLOGY CLINIC | Age: 83
End: 2018-05-04

## 2018-05-04 ENCOUNTER — OFFICE VISIT (OUTPATIENT)
Dept: CARDIOLOGY CLINIC | Age: 83
End: 2018-05-04

## 2018-05-04 VITALS
RESPIRATION RATE: 16 BRPM | BODY MASS INDEX: 22.26 KG/M2 | DIASTOLIC BLOOD PRESSURE: 80 MMHG | HEIGHT: 71 IN | SYSTOLIC BLOOD PRESSURE: 150 MMHG | HEART RATE: 60 BPM | WEIGHT: 159 LBS | OXYGEN SATURATION: 98 %

## 2018-05-04 DIAGNOSIS — Z98.61 HISTORY OF PTCA: ICD-10-CM

## 2018-05-04 DIAGNOSIS — G20 PARKINSON'S DISEASE (HCC): ICD-10-CM

## 2018-05-04 DIAGNOSIS — R55 SYNCOPE AND COLLAPSE: ICD-10-CM

## 2018-05-04 DIAGNOSIS — R07.9 CHEST PAIN, UNSPECIFIED TYPE: ICD-10-CM

## 2018-05-04 DIAGNOSIS — I25.10 ATHEROSCLEROSIS OF NATIVE CORONARY ARTERY OF NATIVE HEART WITHOUT ANGINA PECTORIS: Primary | ICD-10-CM

## 2018-05-04 DIAGNOSIS — R06.02 SHORTNESS OF BREATH: ICD-10-CM
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
## 2018-05-04 NOTE — PROGRESS NOTES
See scanned document. Ordering Doctor:Dr. Barbara Lund  Reading Doctor:Dr. Barbara Lund    Patient tolerated procedure well.

## 2018-05-04 NOTE — MR AVS SNAPSHOT
[address] [place] [address] 
[phone] Patient: Shar Byrd MRN:  :[id number] Visit Information Date & Time Provider Department Dept. Phone Encounter #  
 2018  2:20 PM Ross Easton MD [facility] [phone] [id number] Upcoming Health Maintenance Date Due  
 GLAUCOMA SCREENING Q2Y 2002 MEDICARE YEARLY EXAM 3/14/2018 Influenza Age 5 to Adult 2018 DTaP/Tdap/Td series (2 - Td) 2024 Allergies as of 2018  Review Complete On: 2018 By: Gerry Avila Severity Noted Reaction Type Reactions Beta Blocker [Beta-blockers (Beta-adrenergic Blocking Agts)]  10/13/2010    Swelling, Other (comments) Confusion, fatigue, and weakness Facial swelling Iodine  2009    Swelling Facial swelling Current Immunizations  Reviewed on 2016 Name Date Hep A Vaccine 1995 Hep A and Hep B Vaccine 2014 Hep B Vaccine 1995 Influenza Vaccine 10/1/2015 Pneumococcal Conjugate (PCV-13) 10/29/2015 Pneumococcal Polysaccharide (PPSV-23) 2014 Tdap 2014 Zoster Vaccine, Live 2006 Not reviewed this visit Vitals BP Pulse Resp Height(growth percentile) Weight(growth percentile) SpO2  
 150/80 (BP 1 Location: Left arm, BP Patient Position: Sitting) 60 16 5' 11\" (1.803 m) 159 lb (72.1 kg) 98% BMI Smoking Status 22.18 kg/m2 Never Smoker BMI and BSA Data Body Mass Index Body Surface Area  
 22.18 kg/m 2 1.9 m 2 Preferred Pharmacy Pharmacy Name Phone [facility], [address] [phone] Your Updated Medication List  
  
   
This list is accurate as of 18  3:13 PM.  Always use your most recent med list.  
  
  
  
  
 aspirin 325 mg tablet Commonly known as:  ASPIRIN  
take 325 mg by mouth daily. atorvastatin 80 mg tablet Commonly known as:  LIPITOR  
TAKE ONE TABLET BY MOUTH EVERY EVENING  
  
 AZILECT 1 mg tablet Generic drug:  rasagiline Take 1 mg by mouth daily. docusate sodium 100 mg capsule Commonly known as:  Ric Parody Take 300 mg by mouth daily. donepezil 10 mg tablet Commonly known as:  ARICEPT Take 10 mg by mouth daily. entacapone 200 mg tablet Commonly known as:  Belvin Gabino Take 1 Tab by mouth six (6) times daily. Please schedule at 0600, 1000, 1400, 1800, 2100 and 0200 (It is very important for the patient to receive his medication on time or he will experience side effects. Please allow 1 hour of window from the scheduled administration time so that patient can take medication 1 hour early or 1 hour late based on symptoms and last administration time) MIRALAX 17 gram packet Generic drug:  polyethylene glycol Take 17 g by mouth daily as needed (Constipation). MYRBETRIQ 25 mg ER tablet Generic drug:  mirabegron ER Take 25 mg by mouth daily. nuclear medicine isotope 2 Each by Does Not Apply route once for 1 dose. Radiopharmaceutical administered:Tc99m Tc Sestamibi (Cardiolite)  Amount administered rest : 11.0 mCi Amount administered stress: 32.5 mCi QUEtiapine 25 mg tablet Commonly known as:  SEROquel Take 50 mg by mouth nightly. regadenoson 0.4 mg/5 mL Syrg injection Commonly known as:  regadenson 5 mL by IntraVENous route once for 1 dose. Indications: MYOCARDIAL PERFUSION IMAGING ADJUNCT  
  
 SINEMET  mg per tablet Generic drug:  carbidopa-levodopa Take 1.5 Tabs by mouth six (6) times daily. Please schedule at 0600, 1000, 1400, 1800, 2100 and 0200 (It is very important for the patient to receive his medication on time or he will experience side effects.  Please allow 1 hour of window from the scheduled administration time so that patient can take medication 1 hour early or 1 hour late based on symptoms and last administration time)  Indications: Parkinsonism Patient Instructions Follow up with Meena Holder in 6 months Introducing [address] introduces [employer] patient portal. Now you can access parts of your medical record, email your doctor's office, and request medication refills online. 1. In your internet browser, go to [email or web address] 2. Click on the First Time User? Click Here link in the Sign In box. You will see the New Member Sign Up page. 3. Enter your [employer] Access Code exactly as it appears below. You will not need to use this code after youve completed the sign-up process. If you do not sign up before the expiration date, you must request a new code. · [employer] Access Code: [email or web address] Expires: 7/2/2018  7:37 AM 
 
4. Enter the last four digits of your Social Security Number (xxxx) and Date of Birth (mm/dd/yyyy) as indicated and click Submit. You will be taken to the next sign-up page. 5. Create a [employer] ID. This will be your [employer] login ID and cannot be changed, so think of one that is secure and easy to remember. 6. Create a [employer] password. You can change your password at any time. 7. Enter your Password Reset Question and Answer. This can be used at a later time if you forget your password. 8. Enter your e-mail address. You will receive e-mail notification when new information is available in [address] 9. Click Sign Up. You can now view and download portions of your medical record. 10. Click the Download Summary menu link to download a portable copy of your medical information. If you have questions, please visit the Frequently Asked Questions section of the [employer] website. Remember, [employer] is NOT to be used for urgent needs. For medical emergencies, dial 911. Now available from your iPhone and Android! Please provide this summary of care documentation to your next provider. Your primary care clinician is listed as Ysitie 68. If you have any questions after today's visit, please call [phone].

## 2018-10-19 ENCOUNTER — TELEPHONE (OUTPATIENT)
Dept: INTERNAL MEDICINE CLINIC | Age: 83
End: 2018-10-19

## 2018-10-19 NOTE — TELEPHONE ENCOUNTER
Wife called to schedule an appointment for next week. Patient informed aide of stomach discomfort a few days ago. Wife noticed a mass and thought it needs to be checked out. PCP doesn't have an opening next week. Wife agreed to an appointment with Dr. Carmen Chen on 10/23/2018. Instructed to go to emergency room if new or increased symptoms or pain. She verbalized an understanding.

## 2018-10-23 ENCOUNTER — OFFICE VISIT (OUTPATIENT)
Dept: INTERNAL MEDICINE CLINIC | Age: 83
End: 2018-10-23

## 2018-10-23 VITALS
OXYGEN SATURATION: 92 % | BODY MASS INDEX: 21.7 KG/M2 | RESPIRATION RATE: 16 BRPM | SYSTOLIC BLOOD PRESSURE: 170 MMHG | HEIGHT: 71 IN | DIASTOLIC BLOOD PRESSURE: 79 MMHG | HEART RATE: 68 BPM | TEMPERATURE: 97.7 F | WEIGHT: 155 LBS

## 2018-10-23 DIAGNOSIS — K40.90 HERNIA, INGUINAL, RIGHT: Primary | ICD-10-CM

## 2018-10-23 DIAGNOSIS — Z23 ENCOUNTER FOR IMMUNIZATION: ICD-10-CM

## 2018-10-23 RX ORDER — OMEPRAZOLE 20 MG/1
20 CAPSULE, DELAYED RELEASE ORAL DAILY
COMMUNITY
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
## 2018-10-23 NOTE — PATIENT INSTRUCTIONS

## 2018-11-30 RX ORDER — ATORVASTATIN CALCIUM 80 MG/1
TABLET, FILM COATED ORAL
Qty: 90 TAB | Refills: 2 | Status: SHIPPED | OUTPATIENT
Start: 2018-11-30 | End: 2018-12-28

## 2018-12-28 ENCOUNTER — OFFICE VISIT (OUTPATIENT)
Dept: CARDIOLOGY CLINIC | Age: 83
End: 2018-12-28

## 2018-12-28 VITALS
DIASTOLIC BLOOD PRESSURE: 60 MMHG | HEIGHT: 71 IN | BODY MASS INDEX: 22.26 KG/M2 | OXYGEN SATURATION: 94 % | WEIGHT: 159 LBS | RESPIRATION RATE: 16 BRPM | HEART RATE: 70 BPM | SYSTOLIC BLOOD PRESSURE: 130 MMHG

## 2018-12-28 DIAGNOSIS — I25.10 ATHEROSCLEROSIS OF NATIVE CORONARY ARTERY OF NATIVE HEART WITHOUT ANGINA PECTORIS: Primary | ICD-10-CM
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
## 2018-12-28 NOTE — PATIENT INSTRUCTIONS
Please get your fasting blood work completed in the next few weeks. You will be scheduled for an 8 month follow up with Dr. Nhan Blanc.

## 2018-12-28 NOTE — PROGRESS NOTES
Visit Vitals  /60 (BP 1 Location: Left arm, BP Patient Position: Sitting)   Pulse 70   Resp 16   Ht 5' 11\" (1.803 m)   Wt 159 lb (72.1 kg)   SpO2 94%   BMI 22.18 kg/m²

## 2019-01-29 ENCOUNTER — HOSPITAL ENCOUNTER (OUTPATIENT)
Dept: LAB | Age: 84
Discharge: HOME | End: 2019-01-29
Payer: MEDICARE

## 2019-01-29 PROCEDURE — 36415 COLL VENOUS BLD VENIPUNCTURE: CPT

## 2019-01-29 PROCEDURE — 80076 HEPATIC FUNCTION PANEL: CPT

## 2019-01-29 PROCEDURE — 80061 LIPID PANEL: CPT

## 2019-01-30 LAB
ALBUMIN SERPL-MCNC: 4 G/DL (ref 3.5–4.7)
ALP SERPL-CCNC: 74 IU/L (ref 39–117)
ALT SERPL-CCNC: 6 IU/L (ref 0–44)
AST SERPL-CCNC: 15 IU/L (ref 0–40)
BILIRUB DIRECT SERPL-MCNC: 0.17 MG/DL (ref 0–0.4)
BILIRUB SERPL-MCNC: 0.5 MG/DL (ref 0–1.2)
CHOLEST SERPL-MCNC: 132 MG/DL (ref 100–199)
HDLC SERPL-MCNC: 67 MG/DL
INTERPRETATION, 910389: NORMAL
LDLC SERPL CALC-MCNC: 57 MG/DL (ref 0–99)
PROT SERPL-MCNC: 5.8 G/DL (ref 6–8.5)
TRIGL SERPL-MCNC: 42 MG/DL (ref 0–149)
VLDLC SERPL CALC-MCNC: 8 MG/DL (ref 5–40)

## 2019-08-19 ENCOUNTER — OFFICE VISIT (OUTPATIENT)
Dept: INTERNAL MEDICINE CLINIC | Age: 84
End: 2019-08-19

## 2019-08-19 ENCOUNTER — HOSPITAL ENCOUNTER (OUTPATIENT)
Dept: LAB | Age: 84
Discharge: HOME | End: 2019-08-19
Payer: MEDICARE

## 2019-08-19 VITALS
BODY MASS INDEX: 20.72 KG/M2 | OXYGEN SATURATION: 97 % | RESPIRATION RATE: 16 BRPM | DIASTOLIC BLOOD PRESSURE: 64 MMHG | SYSTOLIC BLOOD PRESSURE: 123 MMHG | WEIGHT: 148 LBS | TEMPERATURE: 98.4 F | HEART RATE: 66 BPM | HEIGHT: 71 IN

## 2019-08-19 DIAGNOSIS — F41.9 ANXIETY: ICD-10-CM

## 2019-08-19 DIAGNOSIS — Z00.00 GENERAL MEDICAL EXAM: ICD-10-CM

## 2019-08-19 DIAGNOSIS — G20 DEMENTIA DUE TO PARKINSON'S DISEASE WITH BEHAVIORAL DISTURBANCE (HCC): Primary | ICD-10-CM

## 2019-08-19 DIAGNOSIS — F02.818 DEMENTIA DUE TO PARKINSON'S DISEASE WITH BEHAVIORAL DISTURBANCE (HCC): Primary | ICD-10-CM

## 2019-08-19 DIAGNOSIS — K40.90 HERNIA, INGUINAL, RIGHT: ICD-10-CM

## 2019-08-19 PROCEDURE — 84443 ASSAY THYROID STIM HORMONE: CPT

## 2019-08-19 PROCEDURE — 80053 COMPREHEN METABOLIC PANEL: CPT

## 2019-08-19 PROCEDURE — 85025 COMPLETE CBC W/AUTO DIFF WBC: CPT

## 2019-08-19 PROCEDURE — 36415 COLL VENOUS BLD VENIPUNCTURE: CPT

## 2019-08-19 RX ORDER — QUETIAPINE FUMARATE 25 MG/1
75 TABLET, FILM COATED ORAL
Qty: 90 TAB | Refills: 0 | Status: ON HOLD
Start: 2019-08-19 | End: 2020-06-26

## 2019-08-19 NOTE — PROGRESS NOTES
HISTORY OF PRESENT ILLNESS  Stephanie Her is a 82 y.o. male. HPI  Seen with wife, Adis Rodriguez, who provides much of the history. He has Parkinson's with dementia. He has seen Dr. Fela Galvin recently, who apparently adjusted the timing of his Sinemet, but does not have the details on this. He remains on Aricept and Azilect and Comtan as well for his significant Parkinson's, as well as Seroquel 25 mg, three tablets at night. She brings him in really to have paperwork done for admission to the [facility], although he is not aware this is the plan. He has few complaints. He does have resting tremor and confusion. His appetite is good. He has not had recent fevers, chills or abdominal symptoms. They are managing constipation with Miralax twice a week and this has worked well. He is needing full care for medication administration, as well as assistance with meals and bathing and dressing. He has not had symptoms of active TB and will have quantiferon gold testing done today. GERD has been well controlled with Prilosec 20 mg.  I discussed code status and he indicates verbally he would not like to be kept alive on machines, he does not have any formal DNR written. Review of Systems   Constitutional: Positive for malaise/fatigue. Negative for chills, fever and weight loss. HENT: Negative for congestion and hearing loss. Respiratory: Negative for cough, shortness of breath and wheezing. Cardiovascular: Negative for chest pain, palpitations, orthopnea, leg swelling and PND. Gastrointestinal: Positive for constipation. Negative for abdominal pain, blood in stool, diarrhea, heartburn and nausea. Genitourinary: Positive for frequency. Negative for dysuria. Musculoskeletal: Negative for myalgias. Neurological: Positive for tremors. Negative for dizziness and headaches. Psychiatric/Behavioral: Positive for memory loss. The patient is nervous/anxious.         Physical Exam   Constitutional: He appears well-developed and well-nourished. HENT:   Head: Normocephalic and atraumatic. Right Ear: Tympanic membrane, external ear and ear canal normal.   Left Ear: Tympanic membrane, external ear and ear canal normal.   Nose: Nose normal. Right sinus exhibits no maxillary sinus tenderness and no frontal sinus tenderness. Left sinus exhibits no maxillary sinus tenderness and no frontal sinus tenderness. Mouth/Throat: Oropharynx is clear and moist and mucous membranes are normal. No oropharyngeal exudate. Eyes: Pupils are equal, round, and reactive to light. Conjunctivae are normal. Right eye exhibits no discharge. Left eye exhibits no discharge. Neck: Normal range of motion. Neck supple. Carotid bruit is not present. No thyromegaly present. Cardiovascular: Normal rate, regular rhythm, normal heart sounds and intact distal pulses. Pulmonary/Chest: Effort normal and breath sounds normal. No respiratory distress. He has no wheezes. He has no rales. Abdominal: Soft. He exhibits no distension. There is no tenderness. There is no rebound and no guarding. Musculoskeletal: He exhibits no edema. Lymphadenopathy:     He has no cervical adenopathy. Neurological: He is alert. Tremor in hands    Skin: No rash noted. Psychiatric: He has a normal mood and affect. His behavior is normal.   Nursing note and vitals reviewed. ASSESSMENT and PLAN  Diagnoses and all orders for this visit:    1. 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 -     METABOLIC PANEL, COMPREHENSIVE  -     TSH 3RD GENERATION  -     CBC WITH AUTOMATED DIFF    2. Hernia, inguinal, right  -     METABOLIC PANEL, COMPREHENSIVE    3. Anxiety-stable cont seroquel qhs for sleep    4.  General medical exam  -     QUANTIFERON-TB GOLD PLUS    Forms for the [facility] filled out

## 2019-08-26 ENCOUNTER — TELEPHONE (OUTPATIENT)
Dept: INTERNAL MEDICINE CLINIC | Age: 84
End: 2019-08-26

## 2019-08-26 NOTE — TELEPHONE ENCOUNTER
Notified patient's wife that we did receive the Sinemet med dosing instructions from 's office & the info has been added to the assisted living forms. Advised we are still waiting for the TB blood test results to come back so once they have been resulted will fax the paperwork over.  Daniel Click voiced understanding.

## 2019-08-26 NOTE — TELEPHONE ENCOUNTER
Bertin Baker, [address] [facility] Office Pool         Env General Message/Vendor Calls     Caller's first and last name: Verito Alcantar       Reason for call: Requesting a call back concerning her husbands Rx prescribed by Dr. Bernie Garces.         Call back required yes/no and why: Yes       Best contact number(s): [phone]       Details to clarify the request:       Brittany Thompson

## 2019-08-27 LAB
ALBUMIN SERPL-MCNC: 4.5 G/DL (ref 3.5–4.7)
ALBUMIN/GLOB SERPL: 2.5 {RATIO} (ref 1.2–2.2)
ALP SERPL-CCNC: 91 IU/L (ref 39–117)
ALT SERPL-CCNC: 5 IU/L (ref 0–44)
AST SERPL-CCNC: 13 IU/L (ref 0–40)
BASOPHILS # BLD AUTO: 0 X10E3/UL (ref 0–0.2)
BASOPHILS NFR BLD AUTO: 1 %
BILIRUB SERPL-MCNC: 0.4 MG/DL (ref 0–1.2)
BUN SERPL-MCNC: 32 MG/DL (ref 8–27)
BUN/CREAT SERPL: 28 (ref 10–24)
CALCIUM SERPL-MCNC: 9.4 MG/DL (ref 8.6–10.2)
CHLORIDE SERPL-SCNC: 103 MMOL/L (ref 96–106)
CO2 SERPL-SCNC: 23 MMOL/L (ref 20–29)
CREAT SERPL-MCNC: 1.14 MG/DL (ref 0.76–1.27)
EOSINOPHIL # BLD AUTO: 0.1 X10E3/UL (ref 0–0.4)
EOSINOPHIL NFR BLD AUTO: 1 %
ERYTHROCYTE [DISTWIDTH] IN BLOOD BY AUTOMATED COUNT: 12.5 % (ref 12.3–15.4)
GAMMA INTERFERON BACKGROUND BLD IA-ACNC: 0.06 IU/ML
GLOBULIN SER CALC-MCNC: 1.8 G/DL (ref 1.5–4.5)
GLUCOSE SERPL-MCNC: 122 MG/DL (ref 65–99)
HCT VFR BLD AUTO: 42.5 % (ref 37.5–51)
HGB BLD-MCNC: 14.4 G/DL (ref 13–17.7)
IMM GRANULOCYTES # BLD AUTO: 0 X10E3/UL (ref 0–0.1)
IMM GRANULOCYTES NFR BLD AUTO: 1 %
LYMPHOCYTES # BLD AUTO: 1.8 X10E3/UL (ref 0.7–3.1)
LYMPHOCYTES NFR BLD AUTO: 30 %
M TB IFN-G BLD-IMP: NEGATIVE
M TB IFN-G CD4+ BCKGRND COR BLD-ACNC: 0.05 IU/ML
MCH RBC QN AUTO: 29.8 PG (ref 26.6–33)
MCHC RBC AUTO-ENTMCNC: 33.9 G/DL (ref 31.5–35.7)
MCV RBC AUTO: 88 FL (ref 79–97)
MITOGEN IGNF BLD-ACNC: >10 IU/ML
MONOCYTES # BLD AUTO: 0.5 X10E3/UL (ref 0.1–0.9)
MONOCYTES NFR BLD AUTO: 8 %
NEUTROPHILS # BLD AUTO: 3.6 X10E3/UL (ref 1.4–7)
NEUTROPHILS NFR BLD AUTO: 59 %
PLATELET # BLD AUTO: 208 X10E3/UL (ref 150–450)
POTASSIUM SERPL-SCNC: 4.4 MMOL/L (ref 3.5–5.2)
PROT SERPL-MCNC: 6.3 G/DL (ref 6–8.5)
QUANTIFERON INCUBATION, QF1T: NORMAL
QUANTIFERON TB2 AG: 0.06 IU/ML
RBC # BLD AUTO: 4.83 X10E6/UL (ref 4.14–5.8)
SERVICE CMNT-IMP: NORMAL
SODIUM SERPL-SCNC: 142 MMOL/L (ref 134–144)
TSH SERPL DL<=0.005 MIU/L-ACNC: 1.74 UIU/ML (ref 0.45–4.5)
WBC # BLD AUTO: 6.1 X10E3/UL (ref 3.4–10.8)

## 2019-08-27 NOTE — PROGRESS NOTES
Assisted living forms filled out & faxed to [facility] [place] [id number] at [facility] at [phone].

## 2019-08-29 ENCOUNTER — TELEPHONE (OUTPATIENT)
Dept: INTERNAL MEDICINE CLINIC | Age: 84
End: 2019-08-29

## 2019-08-29 NOTE — TELEPHONE ENCOUNTER
----- Message from Marla June sent at 8/29/2019 10:52 AM EDT -----  Regarding: Dr. Emperatriz Peters / Louisa Dunbar from [facility] the [facility] says they received the paper work for pt's recent physical but the hand written medication list is not clear. Caller requests that pcp fax over a printed copy of the med list immediately.  Caller best phone: [phone], Fax:  [phone]

## 2019-09-12 ENCOUNTER — TELEPHONE (OUTPATIENT)
Dept: INTERNAL MEDICINE CLINIC | Age: 84
End: 2019-09-12

## 2019-09-12 NOTE — TELEPHONE ENCOUNTER
Spoke with Winifred from [facility] who states that the patient has been refusing his 2 am scheduled dose of carbidopa/levodopa because he does not want to be awakened from sleep. He is currently scheduled at the following hours: 3am, 6am, 9am, 12pm, 3pm, 6pm, 9pm.    Please advise.

## 2019-09-13 NOTE — TELEPHONE ENCOUNTER
Per PCP, patient is to move his 2 a.m dose of sinetmet to 7 a.m & skip the 6 a.m dose until the center can reach Dr. Monae Carey for long-term instructions. Order faxed to the [facility] at [place].

## 2019-10-07 DIAGNOSIS — R26.9 GAIT DIFFICULTY: Primary | ICD-10-CM

## 2019-10-07 DIAGNOSIS — Z91.81 AT HIGH RISK FOR FALLS: ICD-10-CM

## 2019-10-13 ENCOUNTER — HOSPITAL ENCOUNTER (EMERGENCY)
Dept: CT IMAGING | Age: 84
Discharge: HOME | End: 2019-10-13
Attending: STUDENT IN AN ORGANIZED HEALTH CARE EDUCATION/TRAINING PROGRAM
Payer: MEDICARE

## 2019-10-13 ENCOUNTER — HOSPITAL ENCOUNTER (EMERGENCY)
Dept: GENERAL RADIOLOGY | Age: 84
Discharge: HOME | End: 2019-10-13
Attending: STUDENT IN AN ORGANIZED HEALTH CARE EDUCATION/TRAINING PROGRAM
Payer: MEDICARE

## 2019-10-13 ENCOUNTER — HOSPITAL ENCOUNTER (EMERGENCY)
Age: 84
Discharge: HOME | End: 2019-10-13
Attending: STUDENT IN AN ORGANIZED HEALTH CARE EDUCATION/TRAINING PROGRAM
Payer: MEDICARE

## 2019-10-13 ENCOUNTER — APPOINTMENT (OUTPATIENT)
Dept: CT IMAGING | Age: 84
End: 2019-10-13
Attending: STUDENT IN AN ORGANIZED HEALTH CARE EDUCATION/TRAINING PROGRAM
Payer: MEDICARE

## 2019-10-13 VITALS
HEART RATE: 64 BPM | TEMPERATURE: 97 F | OXYGEN SATURATION: 97 % | RESPIRATION RATE: 16 BRPM | SYSTOLIC BLOOD PRESSURE: 185 MMHG | DIASTOLIC BLOOD PRESSURE: 81 MMHG
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
## 2019-10-13 DIAGNOSIS — S01.01XA LACERATION OF SCALP, INITIAL ENCOUNTER: ICD-10-CM

## 2019-10-13 DIAGNOSIS — T07.XXXA MULTIPLE CONTUSIONS: ICD-10-CM

## 2019-10-13 PROCEDURE — 74011000250 HC RX REV CODE- 250: Performed by: STUDENT IN AN ORGANIZED HEALTH CARE EDUCATION/TRAINING PROGRAM

## 2019-10-13 PROCEDURE — 72125 CT NECK SPINE W/O DYE: CPT

## 2019-10-13 PROCEDURE — 73700 CT LOWER EXTREMITY W/O DYE: CPT

## 2019-10-13 PROCEDURE — 75810000293 HC SIMP/SUPERF WND  RPR

## 2019-10-13 PROCEDURE — 99284 EMERGENCY DEPT VISIT MOD MDM: CPT

## 2019-10-13 PROCEDURE — 77030008460 HC STPLR SKN PRECIS 3M -A

## 2019-10-13 PROCEDURE — 70450 CT HEAD/BRAIN W/O DYE: CPT

## 2019-10-13 PROCEDURE — 73502 X-RAY EXAM HIP UNI 2-3 VIEWS: CPT

## 2019-10-13 PROCEDURE — 77030008027

## 2019-10-13 PROCEDURE — 73562 X-RAY EXAM OF KNEE 3: CPT

## 2019-10-13 RX ADMIN — Medication 2 ML: at 07:47

## 2019-10-13 NOTE — ED TRIAGE NOTES
Pt presents to ED per RS after GLF at memory [facility] center across the street. Pt with a hx of dementia secondary to Parkinson's. Pt at his normal mental status per [facility] and Memory Care staff. Dr Mendez Render in to examine pt.
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
## 2019-10-13 NOTE — ED NOTES
Patient  discharged with instructions to pt's wife per Dr Wilbert Cordon. Instructions reviewed with pt's wife.

## 2019-10-22 ENCOUNTER — OFFICE VISIT (OUTPATIENT)
Dept: INTERNAL MEDICINE CLINIC | Age: 84
End: 2019-10-22

## 2019-10-22 VITALS
SYSTOLIC BLOOD PRESSURE: 157 MMHG | HEIGHT: 71 IN | OXYGEN SATURATION: 97 % | TEMPERATURE: 97.8 F | HEART RATE: 61 BPM | RESPIRATION RATE: 16 BRPM | BODY MASS INDEX: 20.86 KG/M2 | DIASTOLIC BLOOD PRESSURE: 80 MMHG | WEIGHT: 149 LBS
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
## 2019-10-22 DIAGNOSIS — S01.01XA SCALP LACERATION, INITIAL ENCOUNTER: Primary | ICD-10-CM

## 2019-10-22 DIAGNOSIS — G20 DEMENTIA DUE TO PARKINSON'S DISEASE WITH BEHAVIORAL DISTURBANCE (HCC): ICD-10-CM

## 2019-10-22 DIAGNOSIS — F02.818 DEMENTIA DUE TO PARKINSON'S DISEASE WITH BEHAVIORAL DISTURBANCE (HCC): ICD-10-CM

## 2019-10-22 NOTE — PROGRESS NOTES
HISTORY OF PRESENT ILLNESS  Alfredo Carroll is a 82 y.o. male. HPI  Nine days ago he had a fall, went to the emergency room, had CT of head and spine, fortunately negative. Did have one suture placed on scalp laceration. Comes in to have this removed. He is accompanied by wife, Poncho Hamm, and caregiver, who both confirm he has had more confusion and has been declining neurologically. He is getting PT, which is good. He has just had a Sinemet dose, which they state will typically calm him back down, but he is quite agitated today and having trouble following any conversation. Review of Systems   Unable to perform ROS: Dementia       Physical Exam   Constitutional: He appears well-developed and well-nourished. HENT:   Head: Normocephalic and atraumatic. Neck: Normal range of motion. Neck supple. Carotid bruit is not present. No thyromegaly present. Cardiovascular: Normal rate, regular rhythm, normal heart sounds and intact distal pulses. Pulmonary/Chest: Effort normal and breath sounds normal. No respiratory distress. He has no wheezes. He has no rales. Musculoskeletal: He exhibits no edema. Neurological: He is alert. Agitated and nonsensical in language talking about a boat which landed   Skin:   Laceration of scalp with clean appearance and 1 metal staple in place   Psychiatric: He has a normal mood and affect. His behavior is normal.   Nursing note and vitals reviewed. ASSESSMENT and PLAN  Diagnoses and all orders for this visit:    1. Scalp laceration, initial encounter    2. Fall, initial encounter    3.  Dementia due to Parkinson's disease with behavioral disturbance ([facility] [address]    Staple removed  Cont meds

## 2019-11-11 ENCOUNTER — OFFICE VISIT (OUTPATIENT)
Dept: CARDIOLOGY CLINIC | Age: 84
End: 2019-11-11

## 2019-11-11 VITALS
RESPIRATION RATE: 16 BRPM | WEIGHT: 144 LBS | HEIGHT: 71 IN | OXYGEN SATURATION: 97 % | BODY MASS INDEX: 20.16 KG/M2 | SYSTOLIC BLOOD PRESSURE: 80 MMHG | HEART RATE: 54 BPM | DIASTOLIC BLOOD PRESSURE: 40 MMHG

## 2019-11-11 DIAGNOSIS — I25.10 ATHEROSCLEROSIS OF NATIVE CORONARY ARTERY OF NATIVE HEART WITHOUT ANGINA PECTORIS: Primary | ICD-10-CM

## 2019-11-11 RX ORDER — MIDODRINE HYDROCHLORIDE 2.5 MG/1
2.5 TABLET ORAL 2 TIMES DAILY
COMMUNITY
End: 2019-11-11 | Stop reason: SDUPTHER

## 2019-11-11 RX ORDER — MIDODRINE HYDROCHLORIDE 2.5 MG/1
2.5 TABLET ORAL 2 TIMES DAILY
Qty: 60 TAB | Refills: 3 | Status: SHIPPED | OUTPATIENT
Start: 2019-11-11 | End: 2019-12-16
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
## 2019-11-11 NOTE — PROGRESS NOTES
Patient resides in [facility]. All prescriptions should be sent to them. Chief Complaint   Patient presents with    Follow-up     8 months. Denies chest pain/shortness of breath/swelling. Occasional dizziness. \  Visit Vitals  BP (!) 80/40 (BP 1 Location: Left arm, BP Patient Position: Sitting)   Pulse (!) 54   Resp 16   Ht 5' 11\" (1.803 m)   Wt 144 lb (65.3 kg)   SpO2 97%   BMI 20.08 kg/m²     Medication profile updated. Verbal order per Dr. Bonnie Bahean.

## 2019-11-11 NOTE — PATIENT INSTRUCTIONS
Start Midodrine 2.5 mg twice a day    BP check daily and call above 160    Follow up with Caitlyn Mosquera in 2-3 weeks

## 2019-12-16 ENCOUNTER — OFFICE VISIT (OUTPATIENT)
Dept: CARDIOLOGY CLINIC | Age: 84
End: 2019-12-16

## 2019-12-16 VITALS
WEIGHT: 139 LBS | RESPIRATION RATE: 14 BRPM | BODY MASS INDEX: 19.46 KG/M2 | SYSTOLIC BLOOD PRESSURE: 110 MMHG | OXYGEN SATURATION: 97 % | DIASTOLIC BLOOD PRESSURE: 68 MMHG | HEIGHT: 71 IN | HEART RATE: 60 BPM

## 2019-12-16 DIAGNOSIS — G20 PARKINSON'S DISEASE (HCC): ICD-10-CM

## 2019-12-16 DIAGNOSIS — I25.10 ATHEROSCLEROSIS OF NATIVE CORONARY ARTERY OF NATIVE HEART WITHOUT ANGINA PECTORIS: Primary | ICD-10-CM

## 2019-12-16 DIAGNOSIS — R55 SYNCOPE AND COLLAPSE: ICD-10-CM

## 2019-12-16 RX ORDER — MIDODRINE HYDROCHLORIDE 2.5 MG/1
2.5 TABLET ORAL 3 TIMES DAILY
Qty: 270 TAB | Refills: 1 | Status: SHIPPED | OUTPATIENT
Start: 2019-12-16 | End: 2020-06-29
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
## 2019-12-23 ENCOUNTER — TELEPHONE (OUTPATIENT)
Dept: CARDIOLOGY CLINIC | Age: 84
End: 2019-12-23

## 2019-12-23 NOTE — TELEPHONE ENCOUNTER
Faxed completed form regarding midodrine and rasagiline to [phone]. Confirmation received. Called facility at [phone]. Spoke to Adelaida. Received form.

## 2020-01-06 ENCOUNTER — HOSPITAL ENCOUNTER (OUTPATIENT)
Dept: LAB | Age: 85
Discharge: HOME | End: 2020-01-06

## 2020-01-06 ENCOUNTER — OFFICE VISIT (OUTPATIENT)
Dept: INTERNAL MEDICINE CLINIC | Age: 85
End: 2020-01-06

## 2020-01-06 VITALS
DIASTOLIC BLOOD PRESSURE: 78 MMHG | SYSTOLIC BLOOD PRESSURE: 132 MMHG | RESPIRATION RATE: 16 BRPM | HEART RATE: 62 BPM | HEIGHT: 71 IN | OXYGEN SATURATION: 96 % | WEIGHT: 139 LBS | TEMPERATURE: 97.5 F | BODY MASS INDEX: 19.46 KG/M2

## 2020-01-06 DIAGNOSIS — R63.4 WEIGHT LOSS: Primary | ICD-10-CM

## 2020-01-06 DIAGNOSIS — R63.4 WEIGHT LOSS: ICD-10-CM

## 2020-01-06 DIAGNOSIS — I10 ESSENTIAL HYPERTENSION: ICD-10-CM

## 2020-01-06 DIAGNOSIS — F02.818 DEMENTIA ASSOCIATED WITH OTHER UNDERLYING DISEASE WITH BEHAVIORAL DISTURBANCE: ICD-10-CM

## 2020-01-06 LAB
ALBUMIN SERPL-MCNC: 3.7 G/DL (ref 3.5–5)
ALBUMIN/GLOB SERPL: 1.3 {RATIO} (ref 1.1–2.2)
ALP SERPL-CCNC: 146 U/L (ref 45–117)
ALT SERPL-CCNC: 13 U/L (ref 12–78)
ANION GAP SERPL CALC-SCNC: 2 MMOL/L (ref 5–15)
AST SERPL-CCNC: 20 U/L (ref 15–37)
BILIRUB SERPL-MCNC: 0.7 MG/DL (ref 0.2–1)
BUN SERPL-MCNC: 23 MG/DL (ref 6–20)
BUN/CREAT SERPL: 21 (ref 12–20)
CALCIUM SERPL-MCNC: 8.7 MG/DL (ref 8.5–10.1)
CHLORIDE SERPL-SCNC: 106 MMOL/L (ref 97–108)
CO2 SERPL-SCNC: 32 MMOL/L (ref 21–32)
CREAT SERPL-MCNC: 1.08 MG/DL (ref 0.7–1.3)
GLOBULIN SER CALC-MCNC: 2.9 G/DL (ref 2–4)
GLUCOSE SERPL-MCNC: 69 MG/DL (ref 65–100)
POTASSIUM SERPL-SCNC: 4.4 MMOL/L (ref 3.5–5.1)
PROT SERPL-MCNC: 6.6 G/DL (ref 6.4–8.2)
SODIUM SERPL-SCNC: 140 MMOL/L (ref 136–145)
T4 SERPL-MCNC: 7.5 UG/DL (ref 4.5–12.1)
TSH SERPL DL<=0.05 MIU/L-ACNC: 1.54 UIU/ML (ref 0.36–3.74)

## 2020-01-06 NOTE — PROGRESS NOTES
HISTORY OF PRESENT ILLNESS  Jose Rafael Greene is a 82 y.o. male. HPI  Seen with wife, [place], who provides all of the history as Mr. Mayda Tubbs dementia is fairly significant. She brings him in today because he has had ongoing weight loss. On the flow sheet from 2018 he was weighing 159, August of 2019 he was down to 148, and now he is down to 139. This has been a gradual, but ongoing weight loss. She notes that at his assisted living facility he does eat regularly and her sons often eat lunch with him and witness him eating. There have been no reports of changes in bowels. His only complaints revolve around joint aches, but no belly pain. He has not had nausea or vomiting to her knowledge. He does have progressive dementia. He is on no new meds. He has occasional cough that has not been persistent. Review of Systems   Unable to perform ROS: Dementia       Physical Exam  Vitals signs and nursing note reviewed. Constitutional:       Appearance: He is well-developed. HENT:      Head: Normocephalic and atraumatic. Neck:      Musculoskeletal: Normal range of motion and neck supple. Thyroid: No thyromegaly. Vascular: No carotid bruit. Cardiovascular:      Rate and Rhythm: Normal rate and regular rhythm. Heart sounds: Normal heart sounds. Pulmonary:      Effort: Pulmonary effort is normal. No respiratory distress. Breath sounds: Normal breath sounds. No wheezing or rales. Abdominal:      General: Abdomen is flat. Bowel sounds are normal. There is no distension. Tenderness: There is no tenderness. Skin:     Comments: Bruise of left hip and small bruises on hands   Neurological:      Mental Status: He is alert. Psychiatric:         Behavior: Behavior normal.         ASSESSMENT and PLAN  Diagnoses and all orders for this visit:    1.  Weight loss-certainly 20 pound loss is worrisome-did discuss could pursue more involved eval including  egd colonoscopy-not sure what we would do if we find a cancer as he is not a surgical candidate and due to dementia can not participate in informed decision making  Wife geneva is the surrogate decision maker and she will think about how aggressive we want ot be in work up if nothing is found with initial labs and cxr  Will stop the lipitor to simplify  -     METABOLIC PANEL, COMPREHENSIVE; Future  -     TSH 3RD GENERATION; Future  -     T4 (THYROXINE); Future  -     XR CHEST PA LAT; Future    2. Dementia associated with other underlying disease with behavioral disturbance ([facility] [address]    3. Essential hypertension  -     METABOLIC PANEL, COMPREHENSIVE;  Future

## 2020-01-08 NOTE — PROGRESS NOTES
Discussed with wife geneva could pursue weight loss further with ct and gi eval-not sure if worth further evaluation-she will discuss her family

## 2020-01-10 ENCOUNTER — TELEPHONE (OUTPATIENT)
Dept: CARDIOLOGY CLINIC | Age: 85
End: 2020-01-10

## 2020-01-10 NOTE — TELEPHONE ENCOUNTER
[facility] [phone] [facility] signature noting a possible reaction between midodrine and rasagiline. Have faxed signed for previously. Verified with Jennifer at pharmacy that completed form has been received. She will fax completed form to [facility] at [facility], [place] [phone]. Spoke to Ardella Bosworth at [facility]. Informed of the above. She will contact [place] [phone] if she does not receive. Signed form has been sent to scanning.

## 2020-02-09 ENCOUNTER — HOSPITAL ENCOUNTER (EMERGENCY)
Dept: CT IMAGING | Age: 85
Discharge: HOME | End: 2020-02-09
Attending: EMERGENCY MEDICINE
Payer: MEDICARE

## 2020-02-09 ENCOUNTER — HOSPITAL ENCOUNTER (EMERGENCY)
Age: 85
Discharge: OTHER INSTITUTION | End: 2020-02-09
Attending: EMERGENCY MEDICINE
Payer: MEDICARE

## 2020-02-09 VITALS
DIASTOLIC BLOOD PRESSURE: 93 MMHG | RESPIRATION RATE: 16 BRPM | HEART RATE: 65 BPM | WEIGHT: 132 LBS | SYSTOLIC BLOOD PRESSURE: 161 MMHG | BODY MASS INDEX: 18.9 KG/M2 | OXYGEN SATURATION: 100 % | TEMPERATURE: 95.6 F | HEIGHT: 70 IN

## 2020-02-09 DIAGNOSIS — S09.90XA INJURY OF HEAD, INITIAL ENCOUNTER: Primary | ICD-10-CM

## 2020-02-09 DIAGNOSIS — E86.0 DEHYDRATION: ICD-10-CM

## 2020-02-09 DIAGNOSIS — K22.5 ZENKER'S DIVERTICULUM: ICD-10-CM

## 2020-02-09 LAB
ANION GAP SERPL CALC-SCNC: 5 MMOL/L (ref 5–15)
APPEARANCE UR: CLEAR
BACTERIA URNS QL MICRO: ABNORMAL /HPF
BILIRUB UR QL: NEGATIVE
BUN SERPL-MCNC: 28 MG/DL (ref 6–20)
BUN/CREAT SERPL: 22 (ref 12–20)
CALCIUM SERPL-MCNC: 8.9 MG/DL (ref 8.5–10.1)
CHLORIDE SERPL-SCNC: 102 MMOL/L (ref 97–108)
CO2 SERPL-SCNC: 31 MMOL/L (ref 21–32)
COLOR UR: ABNORMAL
CREAT SERPL-MCNC: 1.26 MG/DL (ref 0.7–1.3)
EPITH CASTS URNS QL MICRO: ABNORMAL /LPF
GLUCOSE SERPL-MCNC: 107 MG/DL (ref 65–100)
GLUCOSE UR STRIP.AUTO-MCNC: NEGATIVE MG/DL
HGB UR QL STRIP: NEGATIVE
HYALINE CASTS URNS QL MICRO: ABNORMAL /LPF (ref 0–5)
KETONES UR QL STRIP.AUTO: ABNORMAL MG/DL
LEUKOCYTE ESTERASE UR QL STRIP.AUTO: NEGATIVE
MUCOUS THREADS URNS QL MICRO: ABNORMAL /LPF
NITRITE UR QL STRIP.AUTO: NEGATIVE
PH UR STRIP: 6.5 [PH] (ref 5–8)
POTASSIUM SERPL-SCNC: 4.1 MMOL/L (ref 3.5–5.1)
PROT UR STRIP-MCNC: NEGATIVE MG/DL
RBC #/AREA URNS HPF: ABNORMAL /HPF (ref 0–5)
SODIUM SERPL-SCNC: 138 MMOL/L (ref 136–145)
SP GR UR REFRACTOMETRY: 1.02 (ref 1–1.03)
TROPONIN I SERPL-MCNC: <0.05 NG/ML
UR CULT HOLD, URHOLD: NORMAL
UROBILINOGEN UR QL STRIP.AUTO: 0.2 EU/DL (ref 0.2–1)
WBC URNS QL MICRO: ABNORMAL /HPF (ref 0–4)

## 2020-02-09 PROCEDURE — 80048 BASIC METABOLIC PNL TOTAL CA: CPT

## 2020-02-09 PROCEDURE — 93005 ELECTROCARDIOGRAM TRACING: CPT

## 2020-02-09 PROCEDURE — 36415 COLL VENOUS BLD VENIPUNCTURE: CPT

## 2020-02-09 PROCEDURE — 84484 ASSAY OF TROPONIN QUANT: CPT

## 2020-02-09 PROCEDURE — 99284 EMERGENCY DEPT VISIT MOD MDM: CPT

## 2020-02-09 PROCEDURE — 72125 CT NECK SPINE W/O DYE: CPT

## 2020-02-09 PROCEDURE — 81001 URINALYSIS AUTO W/SCOPE: CPT

## 2020-02-09 PROCEDURE — 70450 CT HEAD/BRAIN W/O DYE: CPT

## 2020-02-09 RX ORDER — LANOLIN ALCOHOL/MO/W.PET/CERES
3 CREAM (GRAM) TOPICAL
COMMUNITY
End: 2020-06-29
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
## 2020-02-09 NOTE — ED TRIAGE NOTES
Pt was wheeled to the treatment area accompanied by his wife. Pt wife states \"He lives at the [facility] I got a call at 130pm that he fell in the dinning room and hit his head but he is not bleeding anywhere but they said he was complaining about his head. He is acting his normal self. He does not take any blood thinners and they did not report any loss of consciousness They did not tell me how he fell. His dementia has been getting worse recently, we have a physical therapist working with him. We are trying to make him use a walker. \"  Pt points to back of head for area of pain in ER.

## 2020-02-09 NOTE — DISCHARGE INSTRUCTIONS
Patient Education        Dehydration: Care Instructions  Your Care Instructions  Dehydration happens when your body loses too much fluid. This might happen when you do not drink enough water or you lose large amounts of fluids from your body because of diarrhea, vomiting, or sweating. Severe dehydration can be life-threatening. Water and minerals called electrolytes help put your body fluids back in balance. Learn the early signs of fluid loss, and drink more fluids to prevent dehydration. Follow-up care is a key part of your treatment and safety. Be sure to make and go to all appointments, and call your doctor if you are having problems. It's also a good idea to know your test results and keep a list of the medicines you take. How can you care for yourself at home? · To prevent dehydration, drink plenty of fluids, enough so that your urine is light yellow or clear like water. Choose water and other caffeine-free clear liquids until you feel better. If you have kidney, heart, or liver disease and have to limit fluids, talk with your doctor before you increase the amount of fluids you drink. · If you do not feel like eating or drinking, try taking small sips of water, sports drinks, or other rehydration drinks. · Get plenty of rest.  To prevent dehydration  · Add more fluids to your diet and daily routine, unless your doctor has told you not to. · During hot weather, drink more fluids. Drink even more fluids if you exercise a lot. Stay away from drinks with alcohol or caffeine. · Watch for the symptoms of dehydration. These include:  ? A dry, sticky mouth. ? Dark yellow urine, and not much of it. ? Dry and sunken eyes. ? Feeling very tired. · Learn what problems can lead to dehydration. These include:  ? Diarrhea, fever, and vomiting. ? Any illness with a fever, such as pneumonia or the flu. ?  Activities that cause heavy sweating, such as endurance races and heavy outdoor work in hot or humid weather. ? Alcohol or drug use or problems caused by quitting their use (withdrawal). ? Certain medicines, such as cold and allergy pills (antihistamines), diet pills (diuretics), and laxatives. ? Certain diseases, such as diabetes, cancer, and heart or kidney disease. When should you call for help? Call 911 anytime you think you may need emergency care. For example, call if:    · You passed out (lost consciousness).    Call your doctor now or seek immediate medical care if:    · You are confused and cannot think clearly.     · You are dizzy or lightheaded, or you feel like you may faint.     · You have signs of needing more fluids. You have sunken eyes and a dry mouth, and you pass only a little dark urine.     · You cannot keep fluids down.    Watch closely for changes in your health, and be sure to contact your doctor if:    · You are not making tears.     · Your skin is very dry and sags slowly back into place after you pinch it.     · Your mouth and eyes are very dry. Where can you learn more? Go to http://jose martin-jorge luis.info/. Enter H031 in the search box to learn more about \"Dehydration: Care Instructions. \"  Current as of: June 26, 2019  Content Version: 12.2  © 9705-8763 [employer]. Care instructions adapted under license by [employer] (which disclaims liability or warranty for this information). If you have questions about a medical condition or this instruction, always ask your healthcare professional. [place] [address] any warranty or liability for your use of this information. Patient Education        Learning About Zenker's Diverticulum  What is Zenker's diverticulum? A Zenker's diverticulum is a pouch or bulge that forms in the esophagus. The esophagus is the tube that connects the throat to the stomach. The muscles in the tube squeeze to move food and liquid from the back of your mouth to your stomach.  The pouch forms because of a weak spot in the esophagus where it joins the lower part of the throat. Food can get caught in the pouch. This can make it hard to swallow and can cause other problems. The trapped food may get sucked into the lungs and lead to pneumonia, an infection in the lungs. What are the symptoms? When you have Zenker's diverticulum, you may:  · Get food or pills caught in your throat. · Have trouble swallowing. · Cough. · Feel like there's a lump in your throat. · Throw up or cough up undigested food that was trapped in the pouch. · Have bad breath. There may be no symptoms if the pouch is small. How is it diagnosed? Zenker's diverticulum is usually diagnosed with a barium swallow. This is a type of X-ray. Before the X-ray, you'll drink a chalky liquid called barium. Barium coats the inside of your esophagus so it shows up better on an X-ray. Sometimes Zenker's diverticulum is found during a test for a different problem in the throat or stomach. How is it treated? A small diverticulum with no symptoms or mild symptoms may not need treatment. A larger diverticulum is usually treated with surgery. Endoscopic treatment instead of surgery may be an option. The endoscope is a thin tube with a light and camera on the end of it. It lets the surgeon see into the throat. After surgery you may need to stay in the hospital overnight or longer. If endoscopic treatment is used, you may be able to go home sooner. After you heal, you may need a follow-up barium swallow test.  Follow-up care is a key part of your treatment and safety. Be sure to make and go to all appointments, and call your doctor if you are having problems. It's also a good idea to know your test results and keep a list of the medicines you take. Where can you learn more? Go to http://jose martin-jorge luis.info/. Enter A004 in the search box to learn more about \"Learning About Zenker's Diverticulum. \"  Current as of: November 7, 2018  Content Version: 12.2  © 8478-7158 [employer]. Care instructions adapted under license by [employer] (which disclaims liability or warranty for this information). If you have questions about a medical condition or this instruction, always ask your healthcare professional. [address] any warranty or liability for your use of this information.

## 2020-02-09 NOTE — ED NOTES
Pt was discharged and given instructions by Dr Luiz Aguero . Pt wife  verbalized good understanding of all discharge instructions, and F/U care. All questions answered. Pt in stable condition on discharge. Pt driven home by his wife.

## 2020-02-10 ENCOUNTER — TELEPHONE (OUTPATIENT)
Dept: INTERNAL MEDICINE CLINIC | Age: 85
End: 2020-02-10

## 2020-02-10 LAB
ATRIAL RATE: 48 BPM
CALCULATED P AXIS, ECG09: 95 DEGREES
CALCULATED R AXIS, ECG10: 38 DEGREES
CALCULATED T AXIS, ECG11: 22 DEGREES
DIAGNOSIS, 93000: NORMAL
P-R INTERVAL, ECG05: 184 MS
Q-T INTERVAL, ECG07: 490 MS
QRS DURATION, ECG06: 88 MS
QTC CALCULATION (BEZET), ECG08: 437 MS
VENTRICULAR RATE, ECG03: 48 BPM

## 2020-02-10 NOTE — TELEPHONE ENCOUNTER
Returned call to Dereck Park. She states her  fell yesterday & he ended up in the ER to r/o a fracture. No acute findings were seen & patient was d/c. Dereck Park states his bp around 10:00 this a.m was 85/65 & he seemed very dizzy & lethargic. She is unsure if his bp has been rechecked. She feels his meds needs to be adjusted. Advised PCP is out of the office today but can see her  tomorrow at 2:15. Dereck Park agreed to the appt. Advised if his bp continues to drop he may need to go back to the ER for eval. Dereck Park voiced understanding.

## 2020-02-10 NOTE — TELEPHONE ENCOUNTER
Wife states pt has dementia and has fallen recently. Pt also has low b/p of 85/65 with dizziness and cant walk. She does not want to wait until his scheduled appt to be seen on 2/17/2020. Would like to speak with a nurse today. Thanks.

## 2020-02-11 ENCOUNTER — OFFICE VISIT (OUTPATIENT)
Dept: INTERNAL MEDICINE CLINIC | Age: 85
End: 2020-02-11

## 2020-02-11 VITALS
TEMPERATURE: 96.3 F | SYSTOLIC BLOOD PRESSURE: 89 MMHG | RESPIRATION RATE: 16 BRPM | HEART RATE: 57 BPM | WEIGHT: 137 LBS | DIASTOLIC BLOOD PRESSURE: 49 MMHG | OXYGEN SATURATION: 98 % | HEIGHT: 70 IN | BODY MASS INDEX: 19.61 KG/M2

## 2020-02-11 DIAGNOSIS — R26.89 DECREASED MOBILITY: ICD-10-CM

## 2020-02-11 DIAGNOSIS — F02.80 DEMENTIA IN PARKINSON'S DISEASE (HCC): ICD-10-CM

## 2020-02-11 DIAGNOSIS — I95.1 ORTHOSTATIC HYPOTENSION: Primary | ICD-10-CM

## 2020-02-11 DIAGNOSIS — G20 DEMENTIA IN PARKINSON'S DISEASE (HCC): ICD-10-CM

## 2020-02-11 DIAGNOSIS — R63.4 WEIGHT LOSS: ICD-10-CM

## 2020-02-11 NOTE — PROGRESS NOTES
HISTORY OF PRESENT ILLNESS  Yehuda Bowden is a 82 y.o. male. HPI  Seen with wife, Catalina Villalba, and son, Juanita Shirley, who provide all of the history. Mr. Prabhu Garcia dementia has progressed and most of his language is nonsensical.  He at times is able to communicate, but this is limited. They note that in the last week he has become increasingly less mobile. He had previously been walking with a walker, but it was hard to communicate with him to let go of the walker when it was time to sit down. He would  to it. Now he is having trouble following instructions to walk with the walker and they bring him in today in a wheelchair. He has had speech therapy evaluation at his residence and there is no evidence of aspiration. They will have an OT and PT evaluation. His blood pressure has been labile, at times drops when he stands up. He is on Midodrine t.i.d., but currently getting his first dose at around 9 AM, second dose at noon, last dose at 8 PM.  Did discuss that spacing this so that it covers 24 hours better would be more effective at helping with avoiding the hypotensive episodes. He has not fallen, but family is certainly concerned about his risk as am I. He has not had recent fevers or chills. He did have a recent ER evaluation after a fall, which included head CT and CT of cervical spine, unrevealing. Labs and urine culture normal.      Review of Systems   Unable to perform ROS: Dementia       Physical Exam  Vitals signs and nursing note reviewed. Constitutional:       Appearance: He is well-developed. He is not toxic-appearing. Comments: Fragile and elderly in wheelchair in nad   HENT:      Head: Normocephalic and atraumatic. Neck:      Musculoskeletal: Normal range of motion and neck supple. Thyroid: No thyromegaly. Vascular: No carotid bruit. Cardiovascular:      Rate and Rhythm: Normal rate and regular rhythm. Heart sounds: Normal heart sounds.    Pulmonary:      Effort: Pulmonary effort is normal. No respiratory distress. Breath sounds: Normal breath sounds. No wheezing or rales. Abdominal:      Palpations: Abdomen is soft. Tenderness: There is no abdominal tenderness. Neurological:      Mental Status: He is alert. Comments: Language is limited speaks at times and can be understood and at times not clear-does follow commands with  Repeat prompting  Becomes more animated with questions about gelati flavors ( his favorite treat)  Able to lift both arms against gravity with prompting   Psychiatric:         Behavior: Behavior normal.         ASSESSMENT and PLAN  Diagnoses and all orders for this visit:    1. Orthostatic hypotension    2. 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 ([facility] [address]    3. Weight loss    4.  Decreased mobility    I think mostly this is progression of his underlying  Dementia and parkinsons  Will try to get the dosing of midodrine more evenly spaced with first dose at 6 am second at noon and last at 8 pm  Will have pt and ot eval  Cont with wheelchair  Consider stopping myrbetriq to simplify regimen discussed with wife and son

## 2020-02-13 ENCOUNTER — TELEPHONE (OUTPATIENT)
Dept: INTERNAL MEDICINE CLINIC | Age: 85
End: 2020-02-13

## 2020-02-13 NOTE — TELEPHONE ENCOUNTER
David with [facility] [address] called to request skilled nursing for patient. David reports that patient's family and home \"[facility]\" have noticed a big decline over the past week and wonder if its a UTI or something medical. David states that patient has orders for PT & OT already. David also reports that patient was seen by ER, & also had several recent falls. Please call to advise.    [phone]

## 2020-02-13 NOTE — TELEPHONE ENCOUNTER
Per MD verbal approval left voicemail providing verbal order to add SN to patient's [place] plan. Discussed patient had a UA done at his most recent ER visit which was neg for a UTI. Office number left if Gerry Luna has additional questions or concerns.

## 2020-02-15 ENCOUNTER — APPOINTMENT (OUTPATIENT)
Dept: CT IMAGING | Age: 85
End: 2020-02-15
Attending: EMERGENCY MEDICINE
Payer: MEDICARE

## 2020-02-15 ENCOUNTER — HOSPITAL ENCOUNTER (EMERGENCY)
Age: 85
Discharge: NURSING, REHAB OR LONG-TERM CARE | End: 2020-02-15
Attending: EMERGENCY MEDICINE
Payer: MEDICARE

## 2020-02-15 VITALS
RESPIRATION RATE: 20 BRPM | TEMPERATURE: 97.2 F | HEART RATE: 57 BPM | SYSTOLIC BLOOD PRESSURE: 191 MMHG | OXYGEN SATURATION: 99 % | DIASTOLIC BLOOD PRESSURE: 90 MMHG

## 2020-02-15 DIAGNOSIS — F03.90 DEMENTIA WITHOUT BEHAVIORAL DISTURBANCE, UNSPECIFIED DEMENTIA TYPE: ICD-10-CM

## 2020-02-15 DIAGNOSIS — S00.81XA ABRASION OF FOREHEAD, INITIAL ENCOUNTER: ICD-10-CM
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
## 2020-02-15 DIAGNOSIS — S09.90XA TRAUMATIC INJURY OF HEAD, INITIAL ENCOUNTER: ICD-10-CM

## 2020-02-15 PROCEDURE — 70450 CT HEAD/BRAIN W/O DYE: CPT

## 2020-02-15 PROCEDURE — 74011250637 HC RX REV CODE- 250/637: Performed by: EMERGENCY MEDICINE

## 2020-02-15 PROCEDURE — 99285 EMERGENCY DEPT VISIT HI MDM: CPT

## 2020-02-15 RX ORDER — ACETAMINOPHEN 500 MG
1000 TABLET ORAL
Status: COMPLETED | OUTPATIENT
Start: 2020-02-15 | End: 2020-02-15

## 2020-02-15 RX ADMIN — ACETAMINOPHEN 1000 MG: 500 TABLET ORAL at 02:55
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
## 2020-02-15 NOTE — ED TRIAGE NOTES
Triage note:  Pt arrived via [facility] and c/o abrasion to forehead s/p GLF. Per EMS pt was found by staff at the [facility] on the floor. Pt oriented to name only.
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
## 2020-02-15 NOTE — ED NOTES
TRANSFER - OUT REPORT:    Verbal report given to Scottie Morin (name) on Julio Barrera  being transferred to [facility] [phone]  (unit) for routine progression of care       Report consisted of patients Situation, Background, Assessment and   Recommendations(SBAR). Information from the following report(s) SBAR and Recent Results was reviewed with the receiving nurse. Lines:       Opportunity for questions and clarification was provided.       Patient transported with:   EMS        Made aware of elevated BP

## 2020-02-15 NOTE — ED NOTES
Discharge note: The patient was discharged home in stable condition, accompanied by [facility] EMS transport. The patient is alert and oriented to name only. The patient's diagnosis, condition and treatment were explained to wife and reinforced by nurse. The wife expressed understanding of discharge instructions and plan of care. A discharge plan has been developed. A  was not involved in the process. Patient transport back to the [facility] via EMS stretcher .

## 2020-02-15 NOTE — DISCHARGE INSTRUCTIONS
Patient Education        Scrapes (Abrasions): Care Instructions  Your Care Instructions  Scrapes (abrasions) are wounds where your skin has been rubbed or torn off. Most scrapes do not go deep into the skin, but some may remove several layers of skin. Scrapes usually don't bleed much, but they may ooze pinkish fluid. Scrapes on the head or face may appear worse than they are. They may bleed a lot because of the good blood supply to this area. Most scrapes heal well and may not need a bandage. They usually heal within 3 to 7 days. A large, deep scrape may take 1 to 2 weeks or longer to heal. A scab may form on some scrapes. Follow-up care is a key part of your treatment and safety. Be sure to make and go to all appointments, and call your doctor if you are having problems. It's also a good idea to know your test results and keep a list of the medicines you take. How can you care for yourself at home? · If your doctor told you how to care for your wound, follow your doctor's instructions. If you did not get instructions, follow this general advice:  ? Wash the scrape with clean water 2 times a day. Don't use hydrogen peroxide or alcohol, which can slow healing. ? You may cover the scrape with a thin layer of petroleum jelly, such as Vaseline, and a nonstick bandage. ? Apply more petroleum jelly and replace the bandage as needed. · Prop up the injured area on a pillow anytime you sit or lie down during the next 3 days. Try to keep it above the level of your heart. This will help reduce swelling. · Be safe with medicines. Take pain medicines exactly as directed. ? If the doctor gave you a prescription medicine for pain, take it as prescribed. ? If you are not taking a prescription pain medicine, ask your doctor if you can take an over-the-counter medicine. When should you call for help?   Call your doctor now or seek immediate medical care if:    · You have signs of infection, such as:  ? Increased pain, swelling, warmth, or redness around the scrape. ? Red streaks leading from the scrape. ? Pus draining from the scrape. ? A fever.     · The scrape starts to bleed, and blood soaks through the bandage. Oozing small amounts of blood is normal.    Watch closely for changes in your health, and be sure to contact your doctor if the scrape is not getting better each day. Where can you learn more? Go to http://jose martin-jorge luis.info/. Enter A374 in the search box to learn more about \"Scrapes (Abrasions): Care Instructions. \"  Current as of: June 26, 2019  Content Version: 12.2  © 9439-0654 [employer]. Care instructions adapted under license by [employer] (which disclaims liability or warranty for this information). If you have questions about a medical condition or this instruction, always ask your healthcare professional. [place] [address] any warranty or liability for your use of this information. Patient Education        Helping A Person With Dementia: Care Instructions  Your Care Instructions    Dementia is a loss of mental skills that affects daily life. It is different from mild memory loss that occurs with aging. Dementia can cause problems with memory, thinking clearly, and planning. It is different for everyone. But it usually gets worse slowly. Some people who have dementia can function well for a long time. But at some point it may become hard for the person to care for himself or herself. It can be upsetting to learn that a loved one has this condition. You may be afraid and worried about what will happen. You may wonder how you will care for the person. There is no cure for dementia. But medicine may be able to slow memory loss and improve thinking for a while. Other medicines may help with sleep, depression, and behavior changes. Dementia is different for everyone. In some cases, people can function well for a long time.  You can help your loved one by making his or her home life easier and safer. You also need to take care of yourself. Caregiving can be stressful. But support is available to help you and give you a break when you need it. The Alzheimer's Association offers good information and support. If you are caring for someone with dementia, you can help make life safer and more comfortable. You can also help your loved one make decisions about future care. You may also want to bring up legal and financial issues. These are hard but important conversations to have. Follow-up care is a key part of your loved one's treatment and safety. Be sure to make and go to all appointments, and call your doctor if your loved one is having problems. It's also a good idea to know your loved one's test results and keep a list of the medicines he or she takes. How can you care for your loved one at home? Taking care of the person  · If the person takes medicine for dementia, help him or her take it exactly as prescribed. Call the doctor if you notice any problems with the medicine. · Make a list of the person's medicines. Review it with all of his or her doctors. · Help the person eat a balanced diet. Serve plenty of whole grains, fruits, and vegetables every day. If the person is not hungry at mealtimes, give snacks at midmorning and in the afternoon. Offer drinks such as Boost, Ensure, or Sustacal if the person is losing weight. · Encourage exercise. Walking and other activities may slow the decline of mental ability. Help the person stay active mentally with reading, crossword puzzles, or other hobbies. · Talk openly with the doctor about any behavior changes. Many people who have dementia become easily upset or agitated or feel worried. There are many things that can cause this, such as medicine side effects, confusion, and pain. It may be helpful to:  ? Keep distractions to a minimum.  It may also help to keep noise levels low and voices quiet.  ? Develop simple daily routines for bathing, dressing, and other activities. And remind your loved one often about upcoming changes to the daily routine, such as trips or appointments. ? Ask what is upsetting him or her. Keep in mind that people who have dementia don't always know why they are upset. · Take steps to help if the person is sundowning. This is the restless behavior and trouble with sleeping that may occur in late afternoon and at night. Try not to let the person nap during the day. Offer a glass of warm milk or caffeine-free tea before bedtime. · Be patient. A task may take the person longer than it used to. · For as long as he or she is able, allow your loved one to make decisions about activities, food, clothing, and other choices. Let him or her be independent, even if tasks take more time or are not done perfectly. Tailor tasks to the person's abilities. For example, if cooking is no longer safe, ask for other help. Your loved one can help set the table, or make simple dishes such as a salad. When the person needs help, offer it gently. Staying safe  · Make your home (or your loved one's home) safe. Tack down rugs, and put no-slip tape in the tub. Install handrails, and put safety switches on stoves and appliances. Keep rooms free of clutter. Make sure walkways around furniture are clear. Do not move furniture around, because the person may become confused. · Use locks on doors and cupboards. Lock up knives, scissors, medicines, cleaning supplies, and other dangerous things. · Do not let the person drive or cook if he or she can't do it safely. A person with dementia should not drive unless he or she is able to pass an on-road driving test. Your state 's license bureau can do a driving test if there is any question. · Get medical alert jewelry for the person so that you can be contacted if he or she wanders away.  If possible, provide a safe place for wandering, such as an enclosed yard or garden. Taking care of yourself  · Ask your doctor about support groups and other resources in your area. · Take care of your health. Be sure to eat healthy foods and get enough rest and exercise. · Take time for yourself. Respite services provide someone to stay with the person for a short time while you get out of the house for a few hours. · Make time for an activity that you enjoy. Read, listen to music, paint, do crafts, or play an instrument, even if it's only for a few minutes a day. · Spend time with family, friends, and others in your support system. When should you call for help? Call 911 anytime you think the person may need emergency care. For example, call if:    · The person who has dementia wanders away and you can't find him or her.     · The person who has dementia is seriously injured.    Call the doctor now or seek immediate medical care if:    · The person suddenly sees things that are not there (hallucinates).     · The person has a sudden change in his or her behavior.    Watch closely for changes in the person's health, and be sure to contact the doctor if:    · The person has symptoms that could cause injury.     · The person has problems with his or her medicine.     · You need more information to care for a person with dementia.     · You need respite care so you can take a break. Where can you learn more? Go to http://jose martin-jorge luis.info/. Enter C097 in the search box to learn more about \"Helping A Person With Dementia: Care Instructions. \"  Current as of: May 28, 2019  Content Version: 12.2  © 4864-7712 [employer]. Care instructions adapted under license by [employer] (which disclaims liability or warranty for this information).  If you have questions about a medical condition or this instruction, always ask your healthcare professional. [address] any warranty or liability for your use of this information.

## 2020-02-21 ENCOUNTER — TELEPHONE (OUTPATIENT)
Dept: INTERNAL MEDICINE CLINIC | Age: 85
End: 2020-02-21
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
## 2020-02-21 NOTE — TELEPHONE ENCOUNTER
Layla Obrien with [facility] called to report that patient fell earlier this week. Patient has a bruise on right hip, Peter Duron is requesting order to have xray. Peter Duron states they will have a mobile company to come out for testing.      Fax# [phone]    Phone# [phone]

## 2020-02-24 ENCOUNTER — TELEPHONE (OUTPATIENT)
Dept: INTERNAL MEDICINE CLINIC | Age: 85
End: 2020-02-24

## 2020-02-24 NOTE — TELEPHONE ENCOUNTER
[facility] left a form for the doctor and wants the doctor to call or fax the form when completed. I placed it in the doctor's mailbox.     Thank you

## 2020-03-02 ENCOUNTER — HOSPITAL ENCOUNTER (EMERGENCY)
Age: 85
Discharge: HOME | End: 2020-03-02
Attending: EMERGENCY MEDICINE
Payer: OTHER MISCELLANEOUS

## 2020-03-02 ENCOUNTER — APPOINTMENT (OUTPATIENT)
Dept: CT IMAGING | Age: 85
End: 2020-03-02
Attending: EMERGENCY MEDICINE
Payer: OTHER MISCELLANEOUS

## 2020-03-02 VITALS
SYSTOLIC BLOOD PRESSURE: 168 MMHG | RESPIRATION RATE: 16 BRPM | TEMPERATURE: 97.8 F | DIASTOLIC BLOOD PRESSURE: 70 MMHG | OXYGEN SATURATION: 100 % | HEART RATE: 55 BPM
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
## 2020-03-02 DIAGNOSIS — S70.01XA CONTUSION OF RIGHT HIP, INITIAL ENCOUNTER: Primary | ICD-10-CM

## 2020-03-02 LAB
ALBUMIN SERPL-MCNC: 3.5 G/DL (ref 3.5–5)
ALBUMIN/GLOB SERPL: 1.3 {RATIO} (ref 1.1–2.2)
ALP SERPL-CCNC: 163 U/L (ref 45–117)
ALT SERPL-CCNC: 10 U/L (ref 12–78)
ANION GAP SERPL CALC-SCNC: 5 MMOL/L (ref 5–15)
APPEARANCE UR: CLEAR
AST SERPL-CCNC: 17 U/L (ref 15–37)
BACTERIA URNS QL MICRO: NEGATIVE /HPF
BASOPHILS # BLD: 0 K/UL (ref 0–0.1)
BASOPHILS NFR BLD: 0 % (ref 0–1)
BILIRUB SERPL-MCNC: 0.4 MG/DL (ref 0.2–1)
BILIRUB UR QL: NEGATIVE
BUN SERPL-MCNC: 34 MG/DL (ref 6–20)
BUN/CREAT SERPL: 24 (ref 12–20)
CALCIUM SERPL-MCNC: 8.7 MG/DL (ref 8.5–10.1)
CHLORIDE SERPL-SCNC: 107 MMOL/L (ref 97–108)
CO2 SERPL-SCNC: 32 MMOL/L (ref 21–32)
COLOR UR: ABNORMAL
CREAT SERPL-MCNC: 1.44 MG/DL (ref 0.7–1.3)
DIFFERENTIAL METHOD BLD: ABNORMAL
EOSINOPHIL # BLD: 0.1 K/UL (ref 0–0.4)
EOSINOPHIL NFR BLD: 1 % (ref 0–7)
EPITH CASTS URNS QL MICRO: ABNORMAL /LPF
ERYTHROCYTE [DISTWIDTH] IN BLOOD BY AUTOMATED COUNT: 13.4 % (ref 11.5–14.5)
GLOBULIN SER CALC-MCNC: 2.8 G/DL (ref 2–4)
GLUCOSE SERPL-MCNC: 107 MG/DL (ref 65–100)
GLUCOSE UR STRIP.AUTO-MCNC: NEGATIVE MG/DL
HCT VFR BLD AUTO: 39 % (ref 36.6–50.3)
HGB BLD-MCNC: 12.4 G/DL (ref 12.1–17)
HGB UR QL STRIP: NEGATIVE
HYALINE CASTS URNS QL MICRO: ABNORMAL /LPF (ref 0–5)
KETONES UR QL STRIP.AUTO: ABNORMAL MG/DL
LEUKOCYTE ESTERASE UR QL STRIP.AUTO: NEGATIVE
LYMPHOCYTES # BLD: 1.5 K/UL
LYMPHOCYTES NFR BLD: 20 % (ref 12–49)
MCH RBC QN AUTO: 29.2 PG (ref 26–34)
MCHC RBC AUTO-ENTMCNC: 31.8 G/DL (ref 30–36.5)
MCV RBC AUTO: 91.8 FL (ref 80–99)
MONOCYTES # BLD: 0.8 K/UL (ref 0–1)
MONOCYTES NFR BLD: 11 % (ref 5–13)
NEUTS SEG # BLD: 4.9 K/UL (ref 1.8–8)
NEUTS SEG NFR BLD: 68 % (ref 32–75)
NITRITE UR QL STRIP.AUTO: NEGATIVE
PH UR STRIP: 6 [PH] (ref 5–8)
PLATELET # BLD AUTO: 252 K/UL (ref 150–400)
PMV BLD AUTO: 8.5 FL (ref 8.9–12.9)
POTASSIUM SERPL-SCNC: 4.4 MMOL/L (ref 3.5–5.1)
PROT SERPL-MCNC: 6.3 G/DL (ref 6.4–8.2)
PROT UR STRIP-MCNC: ABNORMAL MG/DL
RBC # BLD AUTO: 4.25 M/UL (ref 4.1–5.7)
RBC #/AREA URNS HPF: ABNORMAL /HPF (ref 0–5)
SODIUM SERPL-SCNC: 144 MMOL/L (ref 136–145)
SP GR UR REFRACTOMETRY: 1.03 (ref 1–1.03)
UR CULT HOLD, URHOLD: NORMAL
UROBILINOGEN UR QL STRIP.AUTO: 1 EU/DL (ref 0.2–1)
WBC # BLD AUTO: 7.4 K/UL (ref 4.1–11.1)
WBC URNS QL MICRO: ABNORMAL /HPF (ref 0–4)

## 2020-03-02 PROCEDURE — 36415 COLL VENOUS BLD VENIPUNCTURE: CPT

## 2020-03-02 PROCEDURE — 70450 CT HEAD/BRAIN W/O DYE: CPT

## 2020-03-02 PROCEDURE — 81001 URINALYSIS AUTO W/SCOPE: CPT

## 2020-03-02 PROCEDURE — 99285 EMERGENCY DEPT VISIT HI MDM: CPT

## 2020-03-02 PROCEDURE — 74176 CT ABD & PELVIS W/O CONTRAST: CPT

## 2020-03-02 PROCEDURE — 85025 COMPLETE CBC W/AUTO DIFF WBC: CPT

## 2020-03-02 PROCEDURE — 80053 COMPREHEN METABOLIC PANEL: CPT

## 2020-03-02 NOTE — DISCHARGE INSTRUCTIONS
Patient Education        Preventing Falls: Care Instructions  Your Care Instructions    Getting around your home safely can be a challenge if you have injuries or health problems that make it easy for you to fall. Loose rugs and furniture in walkways are among the dangers for many older people who have problems walking or who have poor eyesight. People who have conditions such as arthritis, osteoporosis, or dementia also have to be careful not to fall. You can make your home safer with a few simple measures. Follow-up care is a key part of your treatment and safety. Be sure to make and go to all appointments, and call your doctor if you are having problems. It's also a good idea to know your test results and keep a list of the medicines you take. How can you care for yourself at home? Taking care of yourself  · You may get dizzy if you do not drink enough water. To prevent dehydration, drink plenty of fluids, enough so that your urine is light yellow or clear like water. Choose water and other caffeine-free clear liquids. If you have kidney, heart, or liver disease and have to limit fluids, talk with your doctor before you increase the amount of fluids you drink. · Exercise regularly to improve your strength, muscle tone, and balance. Walk if you can. Swimming may be a good choice if you cannot walk easily. · Have your vision and hearing checked each year or any time you notice a change. If you have trouble seeing and hearing, you might not be able to avoid objects and could lose your balance. · Know the side effects of the medicines you take. Ask your doctor or pharmacist whether the medicines you take can affect your balance. Sleeping pills or sedatives can affect your balance. · Limit the amount of alcohol you drink. Alcohol can impair your balance and other senses. · Ask your doctor whether calluses or corns on your feet need to be removed.  If you wear loose-fitting shoes because of calluses or corns, you can lose your balance and fall. · Talk to your doctor if you have numbness in your feet. Preventing falls at home  · Remove raised doorway thresholds, throw rugs, and clutter. Repair loose carpet or raised areas in the floor. · Move furniture and electrical cords to keep them out of walking paths. · Use nonskid floor wax, and wipe up spills right away, especially on ceramic tile floors. · If you use a walker or cane, put rubber tips on it. If you use crutches, clean the bottoms of them regularly with an abrasive pad, such as steel wool. · Keep your house well lit, especially Raymond Pavy, and outside walkways. Use night-lights in areas such as hallways and bathrooms. Add extra light switches or use remote switches (such as switches that go on or off when you clap your hands) to make it easier to turn lights on if you have to get up during the night. · Install sturdy handrails on stairways. · Move items in your cabinets so that the things you use a lot are on the lower shelves (about waist level). · Keep a cordless phone and a flashlight with new batteries by your bed. If possible, put a phone in each of the main rooms of your house, or carry a cell phone in case you fall and cannot reach a phone. Or, you can wear a device around your neck or wrist. You push a button that sends a signal for help. · Wear low-heeled shoes that fit well and give your feet good support. Use footwear with nonskid soles. Check the heels and soles of your shoes for wear. Repair or replace worn heels or soles. · Do not wear socks without shoes on wood floors. · Walk on the grass when the sidewalks are slippery. If you live in an area that gets snow and ice in the winter, sprinkle salt on slippery steps and sidewalks. Preventing falls in the bath  · Install grab bars and nonskid mats inside and outside your shower or tub and near the toilet and sinks. · Use shower chairs and bath benches.   · Use a hand-held shower head that will allow you to sit while showering. · Get into a tub or shower by putting the weaker leg in first. Get out of a tub or shower with your strong side first.  · Repair loose toilet seats and consider installing a raised toilet seat to make getting on and off the toilet easier. · Keep your bathroom door unlocked while you are in the shower. Where can you learn more? Go to http://jose martin-jorge luis.info/. Enter [id number] [id number] [phone] in the search box to learn more about \"Preventing Falls: Care Instructions. \"  Current as of: November 7, 2018  Content Version: 12.2  © 7007-7184 [employer], [employer]. Care instructions adapted under license by [employer] (which disclaims liability or warranty for this information). If you have questions about a medical condition or this instruction, always ask your healthcare professional. [address] any warranty or liability for your use of this information.

## 2020-03-02 NOTE — ED NOTES
Pt son arrived to room. Wife states \"cancel transport my son will help me take him home. \" [facility] canceled pt assisted to wheelchair and assisted to car by ER Tech for transport home by wife and son.

## 2020-03-02 NOTE — ED NOTES
Pt was discharged and wife  given instructions by Dr Sonny De La Torre. Pt wife verbalized good understanding of all discharge instructions, and F/U care. All questions answered. Pt awaiting [facility] home to [facility]. Wife at bedside waiting with pt. Gonzalo Mendez

## 2020-03-02 NOTE — ED NOTES
Pt attempted to use urinal no urge to void at this time. Wife in room declines cath collection declines urine test at this time. Dr Danielle Andrade made aware.

## 2020-03-02 NOTE — ED TRIAGE NOTES
Pt arrives via [employer]. Per squad report \"Pt had ground level fall in the mens bathroom possible deformity right hip fall was unwitnessed. On the floor for under 15 min. Blood sugar 137. Pt is awake has a [employer] employee with him lb staff member states he had dementia he is acting his normal self wife is on the way to ER. Pt has large swelling to right hip.
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
## 2020-03-09 DIAGNOSIS — G20 DEMENTIA IN PARKINSON'S DISEASE (HCC): Primary | ICD-10-CM

## 2020-03-09 DIAGNOSIS — F02.80 DEMENTIA IN PARKINSON'S DISEASE (HCC): Primary | ICD-10-CM

## 2020-03-09 RX ORDER — CARBIDOPA AND LEVODOPA 25; 100 MG/1; MG/1
1.5 TABLET ORAL
Qty: 90 TAB | Refills: 2 | Status: ON HOLD | OUTPATIENT
Start: 2020-03-09 | End: 2020-06-26

## 2020-06-26 ENCOUNTER — HOSPITAL ENCOUNTER (INPATIENT)
Age: 85
LOS: 3 days | Discharge: HOSPICE | DRG: 551 | End: 2020-06-29
Attending: STUDENT IN AN ORGANIZED HEALTH CARE EDUCATION/TRAINING PROGRAM | Admitting: FAMILY MEDICINE
Payer: MEDICARE

## 2020-06-26 ENCOUNTER — HOSPITAL ENCOUNTER (EMERGENCY)
Age: 85
Discharge: ANOTHER HOSPITAL | End: 2020-06-26
Attending: EMERGENCY MEDICINE | Admitting: EMERGENCY MEDICINE
Payer: MEDICARE

## 2020-06-26 ENCOUNTER — APPOINTMENT (OUTPATIENT)
Dept: CT IMAGING | Age: 85
End: 2020-06-26
Attending: EMERGENCY MEDICINE
Payer: MEDICARE

## 2020-06-26 DIAGNOSIS — S00.03XA SCALP HEMATOMA, INITIAL ENCOUNTER: ICD-10-CM

## 2020-06-26 DIAGNOSIS — S01.01XA LACERATION OF SCALP, INITIAL ENCOUNTER: ICD-10-CM

## 2020-06-26 DIAGNOSIS — S12.111A CLOSED ODONTOID FRACTURE WITH TYPE II MORPHOLOGY AND POSTERIOR DISPLACEMENT, INITIAL ENCOUNTER (HCC): Primary | ICD-10-CM

## 2020-06-26 PROBLEM — S12.9XXA CERVICAL SPINE FRACTURE (HCC): Status: ACTIVE | Noted: 2020-06-26

## 2020-06-26 PROBLEM — S12.9XXA CERVICAL VERTEBRAL FRACTURE (HCC): Status: ACTIVE | Noted: 2020-06-26

## 2020-06-26 LAB
ALBUMIN SERPL-MCNC: 3.5 G/DL (ref 3.5–5)
ALBUMIN/GLOB SERPL: 1.1 {RATIO} (ref 1.1–2.2)
ALP SERPL-CCNC: 134 U/L (ref 45–117)
ALT SERPL-CCNC: 8 U/L (ref 12–78)
ANION GAP SERPL CALC-SCNC: 5 MMOL/L (ref 5–15)
APTT PPP: 25.3 SEC (ref 22.1–32)
AST SERPL-CCNC: 15 U/L (ref 15–37)
BASOPHILS # BLD: 0 K/UL (ref 0–0.1)
BASOPHILS NFR BLD: 0 % (ref 0–1)
BILIRUB SERPL-MCNC: 0.5 MG/DL (ref 0.2–1)
BUN SERPL-MCNC: 24 MG/DL (ref 6–20)
BUN/CREAT SERPL: 23 (ref 12–20)
CALCIUM SERPL-MCNC: 8.9 MG/DL (ref 8.5–10.1)
CHLORIDE SERPL-SCNC: 102 MMOL/L (ref 97–108)
CO2 SERPL-SCNC: 33 MMOL/L (ref 21–32)
COMMENT, HOLDF: NORMAL
CREAT SERPL-MCNC: 1.03 MG/DL (ref 0.7–1.3)
DIFFERENTIAL METHOD BLD: NORMAL
EOSINOPHIL # BLD: 0.1 K/UL (ref 0–0.4)
EOSINOPHIL NFR BLD: 1 % (ref 0–7)
ERYTHROCYTE [DISTWIDTH] IN BLOOD BY AUTOMATED COUNT: 13.2 % (ref 11.5–14.5)
GLOBULIN SER CALC-MCNC: 3.1 G/DL (ref 2–4)
GLUCOSE SERPL-MCNC: 130 MG/DL (ref 65–100)
HCT VFR BLD AUTO: 40.5 % (ref 36.6–50.3)
HGB BLD-MCNC: 13.1 G/DL (ref 12.1–17)
IMM GRANULOCYTES # BLD AUTO: 0 K/UL (ref 0–0.04)
IMM GRANULOCYTES NFR BLD AUTO: 0 % (ref 0–0.5)
INR PPP: 1 (ref 0.9–1.1)
LYMPHOCYTES # BLD: 1.4 K/UL (ref 0.8–3.5)
LYMPHOCYTES NFR BLD: 26 % (ref 12–49)
MCH RBC QN AUTO: 28.6 PG (ref 26–34)
MCHC RBC AUTO-ENTMCNC: 32.3 G/DL (ref 30–36.5)
MCV RBC AUTO: 88.4 FL (ref 80–99)
MONOCYTES # BLD: 0.4 K/UL (ref 0–1)
MONOCYTES NFR BLD: 8 % (ref 5–13)
NEUTS SEG # BLD: 3.3 K/UL (ref 1.8–8)
NEUTS SEG NFR BLD: 63 % (ref 32–75)
NRBC # BLD: 0 K/UL (ref 0–0.01)
NRBC BLD-RTO: 0 PER 100 WBC
PLATELET # BLD AUTO: 235 K/UL (ref 150–400)
PMV BLD AUTO: 8.9 FL (ref 8.9–12.9)
POTASSIUM SERPL-SCNC: 3.7 MMOL/L (ref 3.5–5.1)
PROT SERPL-MCNC: 6.6 G/DL (ref 6.4–8.2)
PROTHROMBIN TIME: 10.2 SEC (ref 9–11.1)
RBC # BLD AUTO: 4.58 M/UL (ref 4.1–5.7)
SAMPLES BEING HELD,HOLD: NORMAL
SARS-COV-2, COV2: NOT DETECTED
SODIUM SERPL-SCNC: 140 MMOL/L (ref 136–145)
SOURCE, COVRS: NORMAL
SPECIMEN SOURCE, FCOV2M: NORMAL
THERAPEUTIC RANGE,PTTT: NORMAL SECS (ref 58–77)
WBC # BLD AUTO: 5.3 K/UL (ref 4.1–11.1)

## 2020-06-26 PROCEDURE — 70450 CT HEAD/BRAIN W/O DYE: CPT

## 2020-06-26 PROCEDURE — 74011250636 HC RX REV CODE- 250/636: Performed by: FAMILY MEDICINE

## 2020-06-26 PROCEDURE — 65270000029 HC RM PRIVATE

## 2020-06-26 PROCEDURE — 85025 COMPLETE CBC W/AUTO DIFF WBC: CPT

## 2020-06-26 PROCEDURE — 85610 PROTHROMBIN TIME: CPT

## 2020-06-26 PROCEDURE — 36415 COLL VENOUS BLD VENIPUNCTURE: CPT

## 2020-06-26 PROCEDURE — 74011250637 HC RX REV CODE- 250/637: Performed by: FAMILY MEDICINE

## 2020-06-26 PROCEDURE — 72125 CT NECK SPINE W/O DYE: CPT

## 2020-06-26 PROCEDURE — 80053 COMPREHEN METABOLIC PANEL: CPT

## 2020-06-26 PROCEDURE — 99285 EMERGENCY DEPT VISIT HI MDM: CPT

## 2020-06-26 PROCEDURE — 74011000250 HC RX REV CODE- 250: Performed by: EMERGENCY MEDICINE

## 2020-06-26 PROCEDURE — 75810000293 HC SIMP/SUPERF WND  RPR

## 2020-06-26 PROCEDURE — 87635 SARS-COV-2 COVID-19 AMP PRB: CPT

## 2020-06-26 PROCEDURE — 85730 THROMBOPLASTIN TIME PARTIAL: CPT

## 2020-06-26 RX ORDER — AMOXICILLIN 250 MG
1 CAPSULE ORAL 2 TIMES DAILY
Status: DISCONTINUED | OUTPATIENT
Start: 2020-06-26 | End: 2020-06-29 | Stop reason: HOSPADM

## 2020-06-26 RX ORDER — DOCUSATE SODIUM 100 MG/1
300 CAPSULE, LIQUID FILLED ORAL DAILY
Status: DISCONTINUED | OUTPATIENT
Start: 2020-06-26 | End: 2020-06-26

## 2020-06-26 RX ORDER — HYDRALAZINE HYDROCHLORIDE 20 MG/ML
10 INJECTION INTRAMUSCULAR; INTRAVENOUS
Status: DISCONTINUED | OUTPATIENT
Start: 2020-06-26 | End: 2020-06-29 | Stop reason: HOSPADM

## 2020-06-26 RX ORDER — QUETIAPINE FUMARATE 50 MG/1
50 TABLET, FILM COATED ORAL 3 TIMES DAILY
COMMUNITY

## 2020-06-26 RX ORDER — POLYETHYLENE GLYCOL 3350 17 G/17G
17 POWDER, FOR SOLUTION ORAL
Status: DISCONTINUED | OUTPATIENT
Start: 2020-06-26 | End: 2020-06-26

## 2020-06-26 RX ORDER — ACETAMINOPHEN 650 MG/1
650 SUPPOSITORY RECTAL
COMMUNITY

## 2020-06-26 RX ORDER — SODIUM CHLORIDE 0.9 % (FLUSH) 0.9 %
5-40 SYRINGE (ML) INJECTION EVERY 8 HOURS
Status: DISCONTINUED | OUTPATIENT
Start: 2020-06-26 | End: 2020-06-29 | Stop reason: HOSPADM

## 2020-06-26 RX ORDER — AMOXICILLIN 250 MG
1 CAPSULE ORAL 2 TIMES DAILY
COMMUNITY

## 2020-06-26 RX ORDER — CARBIDOPA AND LEVODOPA 25; 100 MG/1; MG/1
1.5 TABLET ORAL
Status: DISCONTINUED | OUTPATIENT
Start: 2020-06-26 | End: 2020-06-29 | Stop reason: HOSPADM

## 2020-06-26 RX ORDER — QUETIAPINE FUMARATE 25 MG/1
50 TABLET, FILM COATED ORAL 3 TIMES DAILY
Status: DISCONTINUED | OUTPATIENT
Start: 2020-06-26 | End: 2020-06-29 | Stop reason: HOSPADM

## 2020-06-26 RX ORDER — CARBIDOPA AND LEVODOPA 25; 100 MG/1; MG/1
1.5 TABLET ORAL
COMMUNITY

## 2020-06-26 RX ORDER — LIDOCAINE HYDROCHLORIDE AND EPINEPHRINE 10; 10 MG/ML; UG/ML
5 INJECTION, SOLUTION INFILTRATION; PERINEURAL ONCE
Status: COMPLETED | OUTPATIENT
Start: 2020-06-26 | End: 2020-06-26

## 2020-06-26 RX ORDER — SODIUM CHLORIDE, SODIUM LACTATE, POTASSIUM CHLORIDE, CALCIUM CHLORIDE 600; 310; 30; 20 MG/100ML; MG/100ML; MG/100ML; MG/100ML
50 INJECTION, SOLUTION INTRAVENOUS CONTINUOUS
Status: DISCONTINUED | OUTPATIENT
Start: 2020-06-26 | End: 2020-06-26

## 2020-06-26 RX ORDER — SODIUM CHLORIDE 0.9 % (FLUSH) 0.9 %
5-40 SYRINGE (ML) INJECTION AS NEEDED
Status: DISCONTINUED | OUTPATIENT
Start: 2020-06-26 | End: 2020-06-29 | Stop reason: HOSPADM

## 2020-06-26 RX ORDER — RASAGILINE 1 MG/1
1 TABLET ORAL DAILY
Status: DISCONTINUED | OUTPATIENT
Start: 2020-06-26 | End: 2020-06-29 | Stop reason: HOSPADM

## 2020-06-26 RX ORDER — PANTOPRAZOLE SODIUM 40 MG/1
40 TABLET, DELAYED RELEASE ORAL
Status: DISCONTINUED | OUTPATIENT
Start: 2020-06-27 | End: 2020-06-29 | Stop reason: HOSPADM

## 2020-06-26 RX ORDER — DONEPEZIL HYDROCHLORIDE 10 MG/1
10 TABLET, FILM COATED ORAL DAILY
Status: DISCONTINUED | OUTPATIENT
Start: 2020-06-26 | End: 2020-06-29 | Stop reason: HOSPADM

## 2020-06-26 RX ADMIN — CARBIDOPA AND LEVODOPA 1.5 TABLET: 25; 100 TABLET ORAL at 21:31

## 2020-06-26 RX ADMIN — HYDRALAZINE HYDROCHLORIDE 10 MG: 20 INJECTION INTRAMUSCULAR; INTRAVENOUS at 10:21

## 2020-06-26 RX ADMIN — QUETIAPINE FUMARATE 50 MG: 25 TABLET ORAL at 21:32

## 2020-06-26 RX ADMIN — Medication 20 ML: at 09:00

## 2020-06-26 RX ADMIN — Medication 10 ML: at 21:32

## 2020-06-26 RX ADMIN — HYDRALAZINE HYDROCHLORIDE 10 MG: 20 INJECTION INTRAMUSCULAR; INTRAVENOUS at 23:45

## 2020-06-26 RX ADMIN — SODIUM CHLORIDE, POTASSIUM CHLORIDE, SODIUM LACTATE AND CALCIUM CHLORIDE 50 ML/HR: 600; 310; 30; 20 INJECTION, SOLUTION INTRAVENOUS at 09:00

## 2020-06-26 RX ADMIN — LIDOCAINE HYDROCHLORIDE,EPINEPHRINE BITARTRATE 50 MG: 10; .01 INJECTION, SOLUTION INFILTRATION; PERINEURAL at 01:58

## 2020-06-26 RX ADMIN — Medication 10 ML: at 14:00

## 2020-06-26 NOTE — PROGRESS NOTES
Discussed with patient's hospice nurse, Chau Blair, [phone]. Patient is currently at memory unit of assisted living at [facility]. He has multiple frequent falls and currently 2 person assist.  He is more appropriate for nursing home care level. It seems like neurosurgery has cleared patient for discharge. Once COVID test came back negative, he may be able to can be discharged. Case management to follow for appropriate placement.

## 2020-06-26 NOTE — PROGRESS NOTES
Bedside and Verbal shift change report given to Allegra (oncoming nurse) by Jelena Hutson (offgoing nurse). Report included the following information SBAR and Kardex.

## 2020-06-26 NOTE — PROGRESS NOTES
Pt's  from nursing facility called for updates, is concerned about hospice status with pt's admission. Dr. Maribel Thomas notified.

## 2020-06-26 NOTE — PROGRESS NOTES
WALTER:  1. Return to [facility] care WHIT. 2. At [facility]. 3. BLS transport. [facility] phone [phone]      Care Management Interventions  PCP Verified by CM: Yes  Palliative Care Criteria Met (RRAT>21 & CHF Dx)?: No  Mode of Transport at Discharge: BLS  Transition of Care Consult (CM Consult): Discharge Planning  MyChart Signup: No  Discharge Durable Medical Equipment: No  Health Maintenance Reviewed: Yes  Physical Therapy Consult: No  Occupational Therapy Consult: No  Speech Therapy Consult: No  Current Support Network: Assisted Living(Memory Care unit at [facility] )  Confirm Follow Up Transport: Family  The Plan for Transition of Care is Related to the Following Treatment Goals : Return to [facility] hospice. The Patient and/or Patient Representative was Provided with a Choice of Provider and Agrees with the Discharge Plan?: Yes  [place] Resource Information Provided?: No  Discharge Location  Discharge Placement: Assisted Living      Reason for Admission:   Cervical vertebral fracture                   RUR Score:          16%           Plan for utilizing home health:      No indication at this time. PCP: First and Last name:  Dr. Geovanny Gonzalez   Name of Practice:    Are you a current patient: Yes/No:    Approximate date of last visit:    Can you participate in a virtual visit with your PCP:                     Current Advanced Directive/Advance Care Plan: On file. Transition of Care Plan:                    CM noted of consult, and patient has dementia. MD notified patient is from a facility, [facility] care unit. RN confirmed patient was followed by [facility] at the facility. CM spoke to Kobi, [address] with [facility] she requested a referral sent through [facility] to them. Kobi provided cm with , Fela Soto [phone] at [facility].  [facility] confirmed patient can come back, and requests admission on Monday due to no staff on weekends. Attending MD aware of the plan, and cm will follow-up on Monday. Patient is total assist for all ADLs, and has private caregivers during the day at the facility. CM to follow.     Ben Newman [facility]
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
## 2020-06-26 NOTE — ED NOTES
PCS form/EMTALA/encounter summary/etc provided to [facility]. Pressure bandage intact on sutured lac/hematoma and aspen c-collar in place prior to transport.
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
## 2020-06-26 NOTE — PROGRESS NOTES
10:10 TRANSFER - OUT REPORT:    Verbal report given to GIANLUCA Shook(name) on Jeffrey Esposito  being transferred to [address] [place] [address](unit) for routine progression of care       Report consisted of patients Situation, Background, Assessment and   Recommendations(SBAR). Information from the following report(s) SBAR, Kardex, MAR, Recent Results and Cardiac Rhythm NSR was reviewed with the receiving nurse. Lines:   Peripheral IV 06/26/20 Right Antecubital (Active)   Site Assessment Clean, dry, & intact 6/26/2020  8:00 AM   Phlebitis Assessment 0 6/26/2020  8:00 AM   Infiltration Assessment 0 6/26/2020  8:00 AM   Dressing Status Clean, dry, & intact 6/26/2020  8:00 AM   Dressing Type Tape;Transparent 6/26/2020  8:00 AM   Hub Color/Line Status Green;Capped 6/26/2020  8:00 AM   Action Taken Open ports on tubing capped 6/26/2020  8:00 AM   Alcohol Cap Used Yes 6/26/2020  8:00 AM        Opportunity for questions and clarification was provided.       Patient transported with:   [employer]     Patient Vitals for the past 4 hrs:   Temp Pulse Resp BP SpO2   06/26/20 0800     95 %   06/26/20 0730 97.6 °F (36.4 °C) 70 17 187/86 100 %   06/26/20 0700  71 18 (!) 174/91                Problem: Non-Violent Restraints  Goal: *Removal from restraints as soon as assessed to be safe  Outcome: Progressing Towards Goal  Goal: *No harm/injury to patient while restraints in use  Outcome: Progressing Towards Goal  Goal: *Patient's dignity will be maintained  Outcome: Progressing Towards Goal  Goal: *Patient Specific Goal (EDIT GOAL, INSERT TEXT)  Outcome: Progressing Towards Goal  Goal: Non-violent Restaints:Standard Interventions  Outcome: Progressing Towards Goal  Goal: Non-violent Restraints:Patient Interventions  Outcome: Progressing Towards Goal  Goal: Patient/Family Education  Outcome: Progressing Towards Goal     Problem: Falls - Risk of  Goal: *Absence of Falls  Description: Document Beny Fall Risk and appropriate interventions in the flowsheet. Outcome: Progressing Towards Goal  Note: Fall Risk Interventions:   Mentation Interventions: Door open when patient unattended, Adequate sleep, hydration, pain control, Familiar objects from home, Family/sitter at bedside, Increase mobility, More frequent rounding, Reorient patient    Medication Interventions: Assess postural VS orthostatic hypotension, Patient to call before getting OOB, Teach patient to arise slowly    Elimination Interventions: Call light in reach, Patient to call for help with toileting needs, Stay With Me (per policy)    History of Falls Interventions: Consult care management for discharge planning, Door open when patient unattended, Investigate reason for fall, Room close to nurse's station  Problem: Patient Education: Go to Patient Education Activity  Goal: Patient/Family Education  Outcome: Progressing Towards Goal     Problem: Pressure Injury - Risk of  Goal: *Prevention of pressure injury  Description: Document Jaziel Scale and appropriate interventions in the flowsheet. Outcome: Progressing Towards Goal  Note: Pressure Injury Interventions:  Sensory Interventions: Assess need for specialty bed, Check visual cues for pain, Discuss PT/OT consult with provider, Float heels, Minimize linen layers, Pad between skin to skin    Moisture Interventions: Apply protective barrier, creams and emollients, Check for incontinence Q2 hours and as needed, Limit adult briefs, Maintain skin hydration (lotion/cream), Minimize layers    Activity Interventions: Assess need for specialty bed, Increase time out of bed, PT/OT evaluation    Mobility Interventions: Assess need for specialty bed, Float heels, PT/OT evaluation, Turn and reposition approx.  every two hours(pillow and wedges)    Nutrition Interventions: Document food/fluid/supplement intake, Discuss nutritional consult with provider    Friction and Shear Interventions: Apply protective barrier, creams and emollients, Lift sheet, Lift team/patient mobility team, Minimize layers  Problem: Patient Education: Go to Patient Education Activity  Goal: Patient/Family Education  Outcome: Progressing Towards Goal

## 2020-06-26 NOTE — ED NOTES
Large hematoma noted above right eye and forehead after provider placed sutures; no longer spurting blood.
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
## 2020-06-26 NOTE — CONSULTS
83yo s/p with advanced Parkinson's disease, dementia and frequent falls. He had a fall yesterday hitting his head with large scalp hematoma. Head CT without evidence of intracranial injury. CT Cspine with displaced Type II odontoid fracture. I discussed this patient at length with his PCP and we agreed he is not a surgical candidate. Will treat this fracture with a cervical collar and follow up with me in 6 weeks as utpatient with repeat cervical CT. I discussed this with his wife as well. We will be available if further concerns arise. Thank you for this consult.

## 2020-06-26 NOTE — PROGRESS NOTES
[id number] paged. Stated someone will be up soon to see the patient. [phone] paged. [id number] paged. Problem: Non-Violent Restraints  Goal: *No harm/injury to patient while restraints in use  Outcome: Progressing Towards Goal  Note: Pt remains free of harm while in restraints     Problem: Falls - Risk of  Goal: *Absence of Falls  Description: Document Rebbecca Persons Fall Risk and appropriate interventions in the flowsheet. Outcome: Progressing Towards Goal  Note: Fall Risk Interventions:       Mentation Interventions: Adequate sleep, hydration, pain control, Evaluate medications/consider consulting pharmacy, More frequent rounding, Reorient patient, Room close to nurse's station, Update white board, Toileting rounds    Medication Interventions: Evaluate medications/consider consulting pharmacy, Patient to call before getting OOB, Teach patient to arise slowly    Elimination Interventions: Call light in reach, Patient to call for help with toileting needs, Stay With Me (per policy), Toileting schedule/hourly rounds    History of Falls Interventions: Evaluate medications/consider consulting pharmacy, Investigate reason for fall, Room close to nurse's station, Assess for delayed presentation/identification of injury for 48 hrs (comment for end date)         Problem: Pressure Injury - Risk of  Goal: *Prevention of pressure injury  Description: Document Jaziel Scale and appropriate interventions in the flowsheet.   Outcome: Progressing Towards Goal  Note: Pressure Injury Interventions:  Sensory Interventions: Assess changes in LOC, Check visual cues for pain, Discuss PT/OT consult with provider, Keep linens dry and wrinkle-free, Minimize linen layers, Monitor skin under medical devices, Pressure redistribution bed/mattress (bed type)    Moisture Interventions: Absorbent underpads, Apply protective barrier, creams and emollients, Check for incontinence Q2 hours and as needed, Minimize layers, Maintain skin hydration (lotion/cream)    Activity Interventions: Pressure redistribution bed/mattress(bed type), PT/OT evaluation    Mobility Interventions: Assess need for specialty bed, Pressure redistribution bed/mattress (bed type), PT/OT evaluation    Nutrition Interventions: Document food/fluid/supplement intake    Friction and Shear Interventions: Apply protective barrier, creams and emollients, Minimize layers           Primary Nurse Lashanda Ly and IGANLUCA Minor performed a dual skin assessment on this patient No impairment noted  Jaziel score is 10. Scattered scars noted. Patient has large hematoma above right eye, swollen shut. Left eye is swollen shut with ability to open slightly. Blanchable redness noted on R buttocks. Bedside and Verbal shift change report given to GINALUCA Okeefe (oncoming nurse) by Bernard Geller RN (offgoing nurse). Report included the following information SBAR, Kardex, Intake/Output, MAR, Recent Results, Cardiac Rhythm NSR and Quality Measures.

## 2020-06-27 VITALS
HEART RATE: 70 BPM | DIASTOLIC BLOOD PRESSURE: 76 MMHG | TEMPERATURE: 97.8 F | RESPIRATION RATE: 18 BRPM | OXYGEN SATURATION: 97 % | SYSTOLIC BLOOD PRESSURE: 140 MMHG

## 2020-06-27 PROCEDURE — 74011250637 HC RX REV CODE- 250/637: Performed by: FAMILY MEDICINE

## 2020-06-27 PROCEDURE — 65270000032 HC RM SEMIPRIVATE

## 2020-06-27 RX ADMIN — Medication 10 ML: at 15:10

## 2020-06-27 RX ADMIN — QUETIAPINE FUMARATE 50 MG: 25 TABLET ORAL at 21:57

## 2020-06-27 RX ADMIN — DONEPEZIL HYDROCHLORIDE 10 MG: 10 TABLET, FILM COATED ORAL at 09:02

## 2020-06-27 RX ADMIN — QUETIAPINE FUMARATE 50 MG: 25 TABLET ORAL at 15:10

## 2020-06-27 RX ADMIN — CARBIDOPA AND LEVODOPA 1.5 TABLET: 25; 100 TABLET ORAL at 12:32

## 2020-06-27 RX ADMIN — CARBIDOPA AND LEVODOPA 1.5 TABLET: 25; 100 TABLET ORAL at 15:10

## 2020-06-27 RX ADMIN — Medication 10 ML: at 21:44

## 2020-06-27 RX ADMIN — CARBIDOPA AND LEVODOPA 1.5 TABLET: 25; 100 TABLET ORAL at 21:57

## 2020-06-27 RX ADMIN — Medication 10 ML: at 06:40

## 2020-06-27 RX ADMIN — CARBIDOPA AND LEVODOPA 1.5 TABLET: 25; 100 TABLET ORAL at 18:05

## 2020-06-27 RX ADMIN — DOCUSATE SODIUM 50MG AND SENNOSIDES 8.6MG 1 TABLET: 8.6; 5 TABLET, FILM COATED ORAL at 18:05

## 2020-06-27 RX ADMIN — DOCUSATE SODIUM 50MG AND SENNOSIDES 8.6MG 1 TABLET: 8.6; 5 TABLET, FILM COATED ORAL at 09:02

## 2020-06-27 RX ADMIN — PANTOPRAZOLE SODIUM 40 MG: 40 TABLET, DELAYED RELEASE ORAL at 06:40

## 2020-06-27 RX ADMIN — CARBIDOPA AND LEVODOPA 1.5 TABLET: 25; 100 TABLET ORAL at 06:40

## 2020-06-27 RX ADMIN — RASAGILINE 1 MG: 1 TABLET ORAL at 09:04

## 2020-06-27 RX ADMIN — QUETIAPINE FUMARATE 50 MG: 25 TABLET ORAL at 09:02

## 2020-06-27 RX ADMIN — CARBIDOPA AND LEVODOPA 1.5 TABLET: 25; 100 TABLET ORAL at 09:02

## 2020-06-27 NOTE — ROUTINE PROCESS
Bedside shift change report given to Shaquille Perry (oncoming nurse) by Cuco Neal RN (offgoing nurse). Report included the following information SBAR, Kardex, Intake/Output, MAR and Recent Results.

## 2020-06-27 NOTE — PROGRESS NOTES
[facility] NP Progress note    Name: Dwaine Hardin  YOB: 1935  MRN: [id number]  Admission Date: 6/26/2020  5:42 AM    Date of service: 6/27/2020 12:21 AM                                Overnight Update:        COVID Negative - testing for surveillance only for placement purposes.  COVID Precautions removed     Eli Torres FNP-C, PA-C  [phone] or TigerText

## 2020-06-27 NOTE — PROGRESS NOTES
Bedside and Verbal shift change report given to Sury Atkinson RN (oncoming nurse) by Drake Law RN (offgoing nurse). Report included the following information SBAR, Kardex, Intake/Output, MAR and Recent Results.

## 2020-06-27 NOTE — PROGRESS NOTES
Pt's son requesting if pt's personal care giver come and stay with patient,checked with Supervisor and got approval for the care giver to stay with pt.

## 2020-06-27 NOTE — PROGRESS NOTES
Patient laying in the bed at this time. Briefed soaked with yellow urine. Tammy care and chux pad changed. Patient head laceration dsg off the patients head at this time. This RN reinforced dsg. Will continue to monitor.

## 2020-06-27 NOTE — PROGRESS NOTES
Bedside shift change report given to Haleigh Lazo RN (oncoming nurse) by Trish Shearer RN (offgoing nurse). Report included the following information SBAR, Kardex, MAR and Recent Results.

## 2020-06-27 NOTE — PROGRESS NOTES
TRANSFER - IN REPORT:    Verbal report received from BobRN(name) on Ann Rodgers  being received from [address](unit) for routine progression of care      Report consisted of patients Situation, Background, Assessment and   Recommendations(SBAR). Information from the following report(s) SBAR, Kardex, Intake/Output, MAR and Recent Results was reviewed with the receiving nurse. Opportunity for questions and clarification was provided. Assessment completed upon patients arrival to unit and care assumed.

## 2020-06-27 NOTE — PROGRESS NOTES
[facility] Adult  Hospitalist Group                                                                                          Hospitalist Progress Note  Blaine Carrizales MD  Answering service: [phone] OR [phone] from in house phone        Date of Service:  2020  NAME:  Chris Carballo  :  1935  MRN:  [id number]      Admission Summary:     Chris Carballo is a 82 y.o. male who was transferred from [address] [place] [address] ER with C-spine fracture. Patient has dementia and history cannot be obtained from patient. History was obtained collectively by talking with patient's hospice nurse over the phone as well as review of the chart. Patient lives at memory unit of an assisted living facility. Patient with multiple frequent falls and at baseline is two-person assist and only can ambulate short distance. Recently patient Seroquel dose was decreased from 100 mg nightly to 75 mg nightly with a concern of drowsiness. Currently after the change, patient has got up overnight and apparently fell. Patient was found on the floor at the nursing home early this a.m. and was found to have head injury with large hematoma at the right temporal region with laceration. Patient was brought to the emergency room at [address] [place] [address] and his laceration was repaired. Further work-up reveals that patient has mildly displaced acute type II odontoid fractures with moderate to severe spinal canal narrowing at C1. Dr Kaelyn Anaya, neuro surgery, was consulted from the ER and patient was transferred to [facility] [place] for further evaluation.     Interval history / Subjective:       Patient is awake and alert, nonverbal.     Assessment & Plan:     Cervical spine fracture  -Status post mechanical fall with C-spine fracture  -Neurosurgery evaluated the patient, recommended conservative management  -Continue cervical collar     Head injury  -Hematoma of the right temple with laceration, repaired in the ER  -Daily dressing     Hypertensive urgency  -IV hydralazine PRN     Parkinson  -Continue Sinemet     Dementia  -On Aricept  -Needing wrist restraints right now  -Patient was under hospice care at his assisted living facility    Code status: DNR  DVT prophylaxis: Christian Diaz discussed with: Patient/Family and Nurse  Anticipated Disposition: SNF/LTC  Anticipated Discharge: 24 hours to 48 hours     Hospital Problems  Date Reviewed: 2/11/2020          Codes Class Noted POA    Cervical vertebral fracture ([address] ICD-10-CM: S12. 9XXA  ICD-9-CM: 805.00  6/26/2020 Unknown        Cervical spine fracture (HCC) ICD-10-CM: S12. 9XXA  ICD-9-CM: 805.00  6/26/2020 Unknown                Review of Systems:   A comprehensive review of systems was negative except for that written in the HPI. Vital Signs:    Last 24hrs VS reviewed since prior progress note. Most recent are:  Visit Vitals  /53 (BP 1 Location: Left arm, BP Patient Position: At rest)   Pulse 69   Temp 98 °F (36.7 °C)   Resp 16   Wt 60 kg (132 lb 4.4 oz)   SpO2 100%   BMI 18.98 kg/m²         Intake/Output Summary (Last 24 hours) at 6/27/2020 1019  Last data filed at 6/26/2020 1909  Gross per 24 hour   Intake 300 ml   Output    Net 300 ml        Physical Examination:             Constitutional:  No acute distress   ENT:  Oral mucosa moist, oropharynx benign. Resp:  CTA bilaterally. No wheezing/rhonchi/rales. No accessory muscle use   CV:  Regular rhythm, normal rate, no murmurs, gallops, rubs    GI:  Soft, non distended, non tender. normoactive bowel sounds, no hepatosplenomegaly     Musculoskeletal:  No edema, warm, 2+ pulses throughout    Neurologic:  Moves all extremities.   Awake and alert     Skin:  Hematoma, right temple, sutures intact       Data Review:    Review and/or order of clinical lab test      Labs:     Recent Labs     06/26/20 0311   WBC 5.3   HGB 13.1   HCT 40.5        Recent Labs     06/26/20 0311      K 3.7      CO2 33*   BUN 24*   CREA 1.03   GLU 130*   CA 8.9     Recent Labs     06/26/20  0311   ALT 8*   *   TBILI 0.5   TP 6.6   ALB 3.5   GLOB 3.1     Recent Labs     06/26/20  0311   INR 1.0   PTP 10.2   APTT 25.3      No results for input(s): FE, TIBC, PSAT, FERR in the last 72 hours. No results found for: FOL, RBCF   No results for input(s): PH, PCO2, PO2 in the last 72 hours. No results for input(s): CPK, CKNDX, TROIQ in the last 72 hours.     No lab exists for component: CPKMB  Lab Results   Component Value Date/Time    Cholesterol, total 132 01/29/2019 09:55 AM    HDL Cholesterol 67 01/29/2019 09:55 AM    LDL, calculated 57 01/29/2019 09:55 AM    Triglyceride 42 01/29/2019 09:55 AM    CHOL/HDL Ratio 3.0 10/08/2010 07:47 AM     Lab Results   Component Value Date/Time    Glucose (POC) 141 (H) 04/03/2018 10:03 AM     Lab Results   Component Value Date/Time    Color YELLOW/STRAW 03/02/2020 04:10 PM    Appearance CLEAR 03/02/2020 04:10 PM    Specific gravity 1.027 03/02/2020 04:10 PM    Specific gravity 1.020 02/09/2020 03:42 PM    pH (UA) 6.0 03/02/2020 04:10 PM    Protein TRACE (A) 03/02/2020 04:10 PM    Glucose NEGATIVE  03/02/2020 04:10 PM    Ketone TRACE (A) 03/02/2020 04:10 PM    Bilirubin NEGATIVE  03/02/2020 04:10 PM    Urobilinogen 1.0 03/02/2020 04:10 PM    Nitrites NEGATIVE  03/02/2020 04:10 PM    Leukocyte Esterase NEGATIVE  03/02/2020 04:10 PM    Epithelial cells FEW 03/02/2020 04:10 PM    Bacteria NEGATIVE  03/02/2020 04:10 PM    WBC 0-4 03/02/2020 04:10 PM    RBC 0-5 03/02/2020 04:10 PM         Medications Reviewed:     Current Facility-Administered Medications   Medication Dose Route Frequency    carbidopa-levodopa (SINEMET)  mg per tablet 1.5 Tab  1.5 Tab Oral 6XD    donepeziL (ARICEPT) tablet 10 mg  10 mg Oral DAILY    pantoprazole (PROTONIX) tablet 40 mg  40 mg Oral ACB    QUEtiapine (SEROquel) tablet 50 mg  50 mg Oral TID    rasagiline (AZILECT) tablet 1 mg  1 mg Oral DAILY    sodium chloride (NS) flush 5-40 mL  5-40 mL IntraVENous Q8H    sodium chloride (NS) flush 5-40 mL  5-40 mL IntraVENous PRN    hydrALAZINE (APRESOLINE) 20 mg/mL injection 10 mg  10 mg IntraVENous Q6H PRN    senna-docusate (PERICOLACE) 8.6-50 mg per tablet 1 Tab  1 Tab Oral BID     ______________________________________________________________________  EXPECTED LENGTH OF STAY: - - -  ACTUAL LENGTH OF STAY:          1                 Elana Adam MD

## 2020-06-27 NOTE — PROGRESS NOTES
Primary Nurse Nicole Duckworth RN and Jef Briceno RN performed a dual skin assessment on this patient Impairment noted- see wound doc flow sheet,Redness to sacrum.   Jaziel score is 10

## 2020-06-27 NOTE — PROGRESS NOTES
TRANSFER - OUT REPORT:    Verbal report given to Desi Lopez RN(name) on Ector Quinonez  being transferred to (unit) for routine progression of care       Report consisted of patients Situation, Background, Assessment and   Recommendations(SBAR). Information from the following report(s) SBAR, Kardex, Intake/Output, MAR and Recent Results was reviewed with the receiving nurse. Lines:   Peripheral IV 06/26/20 Right Antecubital (Active)   Site Assessment Clean, dry, & intact 6/26/2020 11:05 PM   Phlebitis Assessment 0 6/26/2020 11:05 PM   Infiltration Assessment 0 6/26/2020 11:05 PM   Dressing Status Clean, dry, & intact 6/26/2020 11:05 PM   Dressing Type Transparent;Tape 6/26/2020 11:05 PM   Hub Color/Line Status Green; Infusing 6/26/2020 11:05 PM   Action Taken Open ports on tubing capped 6/26/2020 11:05 PM   Alcohol Cap Used Yes 6/26/2020 11:05 PM        Opportunity for questions and clarification was provided.       Patient transported with:   D[place]

## 2020-06-28 LAB
ANION GAP SERPL CALC-SCNC: 5 MMOL/L (ref 5–15)
BUN SERPL-MCNC: 32 MG/DL (ref 6–20)
BUN/CREAT SERPL: 28 (ref 12–20)
CALCIUM SERPL-MCNC: 8.4 MG/DL (ref 8.5–10.1)
CHLORIDE SERPL-SCNC: 103 MMOL/L (ref 97–108)
CO2 SERPL-SCNC: 29 MMOL/L (ref 21–32)
CREAT SERPL-MCNC: 1.13 MG/DL (ref 0.7–1.3)
GLUCOSE BLD STRIP.AUTO-MCNC: 90 MG/DL (ref 65–100)
GLUCOSE SERPL-MCNC: 111 MG/DL (ref 65–100)
POTASSIUM SERPL-SCNC: 3.8 MMOL/L (ref 3.5–5.1)
SERVICE CMNT-IMP: NORMAL
SODIUM SERPL-SCNC: 137 MMOL/L (ref 136–145)

## 2020-06-28 PROCEDURE — 36415 COLL VENOUS BLD VENIPUNCTURE: CPT

## 2020-06-28 PROCEDURE — 82962 GLUCOSE BLOOD TEST: CPT

## 2020-06-28 PROCEDURE — 65270000032 HC RM SEMIPRIVATE

## 2020-06-28 PROCEDURE — 74011250637 HC RX REV CODE- 250/637: Performed by: FAMILY MEDICINE

## 2020-06-28 PROCEDURE — 74011250636 HC RX REV CODE- 250/636: Performed by: FAMILY MEDICINE

## 2020-06-28 PROCEDURE — 80048 BASIC METABOLIC PNL TOTAL CA: CPT

## 2020-06-28 RX ORDER — SODIUM CHLORIDE, SODIUM LACTATE, POTASSIUM CHLORIDE, CALCIUM CHLORIDE 600; 310; 30; 20 MG/100ML; MG/100ML; MG/100ML; MG/100ML
50 INJECTION, SOLUTION INTRAVENOUS CONTINUOUS
Status: DISCONTINUED | OUTPATIENT
Start: 2020-06-28 | End: 2020-06-29 | Stop reason: HOSPADM

## 2020-06-28 RX ADMIN — PANTOPRAZOLE SODIUM 40 MG: 40 TABLET, DELAYED RELEASE ORAL at 06:33

## 2020-06-28 RX ADMIN — Medication 10 ML: at 05:32

## 2020-06-28 RX ADMIN — QUETIAPINE FUMARATE 50 MG: 25 TABLET ORAL at 22:34

## 2020-06-28 RX ADMIN — CARBIDOPA AND LEVODOPA 1.5 TABLET: 25; 100 TABLET ORAL at 05:54

## 2020-06-28 RX ADMIN — Medication 10 ML: at 14:28

## 2020-06-28 RX ADMIN — CARBIDOPA AND LEVODOPA 1.5 TABLET: 25; 100 TABLET ORAL at 22:34

## 2020-06-28 RX ADMIN — CARBIDOPA AND LEVODOPA 1.5 TABLET: 25; 100 TABLET ORAL at 18:01

## 2020-06-28 RX ADMIN — Medication 10 ML: at 22:35

## 2020-06-28 RX ADMIN — SODIUM CHLORIDE, SODIUM LACTATE, POTASSIUM CHLORIDE, AND CALCIUM CHLORIDE 50 ML/HR: 600; 310; 30; 20 INJECTION, SOLUTION INTRAVENOUS at 18:10

## 2020-06-28 NOTE — PROGRESS NOTES
Pt has been very drowsy and not arousing with Painful stimuli since this morning,vitals and BS are stable. Holding morning pills. Notified MD.Advised to monitor. 1530-Pt is still drowsy but responding to painful stimuli. Notified MD.Holding Meds.

## 2020-06-28 NOTE — PROGRESS NOTES
[facility] Adult  Hospitalist Group                                                                                          Hospitalist Progress Note  Juan Lora MD  Answering service: [phone] OR [phone] from in house phone        Date of Service:  2020  NAME:  Pavithra Wilhelm  :  1935  MRN:  [id number]      Admission Summary:     Pavithra Wilhelm is a 82 y.o. male who was transferred from [address] [place] [address] ER with C-spine fracture. Patient has dementia and history cannot be obtained from patient. History was obtained collectively by talking with patient's hospice nurse over the phone as well as review of the chart. Patient lives at memory unit of an assisted living facility. Patient with multiple frequent falls and at baseline is two-person assist and only can ambulate short distance. Recently patient Seroquel dose was decreased from 100 mg nightly to 75 mg nightly with a concern of drowsiness. Currently after the change, patient has got up overnight and apparently fell. Patient was found on the floor at the nursing home early this a.m. and was found to have head injury with large hematoma at the right temporal region with laceration. Patient was brought to the emergency room at [address] [place] [address] and his laceration was repaired. Further work-up reveals that patient has mildly displaced acute type II odontoid fractures with moderate to severe spinal canal narrowing at C1. Dr Katelyn Sharpe, neuro surgery, was consulted from the ER and patient was transferred to [facility] [place] for further evaluation.     Interval history / Subjective:       Patient is awake and alert, nonverbal.     Assessment & Plan:     Cervical spine fracture  -Status post mechanical fall with C-spine fracture  -Neurosurgery evaluated the patient, recommended conservative management  -Continue cervical collar     Head injury  -Hematoma of the right temple with laceration, repaired in the ER  -Daily dressing     Hypertensive urgency  -IV hydralazine PRN     Parkinson  -Continue Sinemet     Dementia  -On Aricept  -Needing wrist restraints right now  -Patient was under hospice care at his assisted living facility    Code status: DNR  DVT prophylaxis: Christian Diaz discussed with: Patient/Family and Nurse  Anticipated Disposition: SNF/LTC  Anticipated Discharge: 24 hours to 48 hours     Hospital Problems  Date Reviewed: 2/11/2020          Codes Class Noted POA    Cervical vertebral fracture ([address] ICD-10-CM: S12. 9XXA  ICD-9-CM: 805.00  6/26/2020 Unknown        Cervical spine fracture (HCC) ICD-10-CM: S12. 9XXA  ICD-9-CM: 805.00  6/26/2020 Unknown                Review of Systems:   A comprehensive review of systems was negative except for that written in the HPI. Vital Signs:    Last 24hrs VS reviewed since prior progress note. Most recent are:  Visit Vitals  /85 (BP 1 Location: Right arm, BP Patient Position: At rest)   Pulse 67   Temp 97.7 °F (36.5 °C)   Resp 16   Wt 60 kg (132 lb 4.4 oz)   SpO2 96%   BMI 18.98 kg/m²         Intake/Output Summary (Last 24 hours) at 6/28/2020 1206  Last data filed at 6/28/2020 0000  Gross per 24 hour   Intake 120 ml   Output 400 ml   Net -280 ml        Physical Examination:             Constitutional:  No acute distress   ENT:  Oral mucosa moist, oropharynx benign. Resp:  CTA bilaterally. No wheezing/rhonchi/rales. No accessory muscle use   CV:  Regular rhythm, normal rate, no murmurs, gallops, rubs    GI:  Soft, non distended, non tender. normoactive bowel sounds, no hepatosplenomegaly     Musculoskeletal:  No edema, warm, 2+ pulses throughout    Neurologic:  Moves all extremities.   Awake and alert     Skin:  Hematoma, right temple, sutures intact       Data Review:    Review and/or order of clinical lab test      Labs:     Recent Labs     06/26/20  0311   WBC 5.3   HGB 13.1   HCT 40.5        Recent Labs     06/28/20  0044 06/26/20  0311    140   K 3.8 3.7    102   CO2 29 33*   BUN 32* 24*   CREA 1.13 1.03   * 130*   CA 8.4* 8.9     Recent Labs     06/26/20  0311   ALT 8*   *   TBILI 0.5   TP 6.6   ALB 3.5   GLOB 3.1     Recent Labs     06/26/20  0311   INR 1.0   PTP 10.2   APTT 25.3      No results for input(s): FE, TIBC, PSAT, FERR in the last 72 hours. No results found for: FOL, RBCF   No results for input(s): PH, PCO2, PO2 in the last 72 hours. No results for input(s): CPK, CKNDX, TROIQ in the last 72 hours.     No lab exists for component: CPKMB  Lab Results   Component Value Date/Time    Cholesterol, total 132 01/29/2019 09:55 AM    HDL Cholesterol 67 01/29/2019 09:55 AM    LDL, calculated 57 01/29/2019 09:55 AM    Triglyceride 42 01/29/2019 09:55 AM    CHOL/HDL Ratio 3.0 10/08/2010 07:47 AM     Lab Results   Component Value Date/Time    Glucose (POC) 90 06/28/2020 09:40 AM    Glucose (POC) 141 (H) 04/03/2018 10:03 AM     Lab Results   Component Value Date/Time    Color YELLOW/STRAW 03/02/2020 04:10 PM    Appearance CLEAR 03/02/2020 04:10 PM    Specific gravity 1.027 03/02/2020 04:10 PM    Specific gravity 1.020 02/09/2020 03:42 PM    pH (UA) 6.0 03/02/2020 04:10 PM    Protein TRACE (A) 03/02/2020 04:10 PM    Glucose NEGATIVE  03/02/2020 04:10 PM    Ketone TRACE (A) 03/02/2020 04:10 PM    Bilirubin NEGATIVE  03/02/2020 04:10 PM    Urobilinogen 1.0 03/02/2020 04:10 PM    Nitrites NEGATIVE  03/02/2020 04:10 PM    Leukocyte Esterase NEGATIVE  03/02/2020 04:10 PM    Epithelial cells FEW 03/02/2020 04:10 PM    Bacteria NEGATIVE  03/02/2020 04:10 PM    WBC 0-4 03/02/2020 04:10 PM    RBC 0-5 03/02/2020 04:10 PM         Medications Reviewed:     Current Facility-Administered Medications   Medication Dose Route Frequency    carbidopa-levodopa (SINEMET)  mg per tablet 1.5 Tab  1.5 Tab Oral 6XD    donepeziL (ARICEPT) tablet 10 mg  10 mg Oral DAILY    pantoprazole (PROTONIX) tablet 40 mg  40 mg Oral ACB    QUEtiapine (SEROquel) tablet 50 mg  50 mg Oral TID    rasagiline (AZILECT) tablet 1 mg  1 mg Oral DAILY    sodium chloride (NS) flush 5-40 mL  5-40 mL IntraVENous Q8H    sodium chloride (NS) flush 5-40 mL  5-40 mL IntraVENous PRN    hydrALAZINE (APRESOLINE) 20 mg/mL injection 10 mg  10 mg IntraVENous Q6H PRN    senna-docusate (PERICOLACE) 8.6-50 mg per tablet 1 Tab  1 Tab Oral BID     ______________________________________________________________________  EXPECTED LENGTH OF STAY: - - -  ACTUAL LENGTH OF STAY:          2                 George Mac MD

## 2020-06-28 NOTE — PROGRESS NOTES
Problem: Non-Violent Restraints  Goal: *Removal from restraints as soon as assessed to be safe  Outcome: Progressing Towards Goal  Goal: *No harm/injury to patient while restraints in use  Outcome: Progressing Towards Goal  Goal: *Patient's dignity will be maintained  Outcome: Progressing Towards Goal     Problem: Falls - Risk of  Goal: *Absence of Falls  Description: Document Yady Fuller Fall Risk and appropriate interventions in the flowsheet. Outcome: Progressing Towards Goal  Note: Fall Risk Interventions:       Mentation Interventions: Bed/chair exit alarm, Door open when patient unattended, More frequent rounding, Reorient patient, Toileting rounds    Medication Interventions: Patient to call before getting OOB, Teach patient to arise slowly, Bed/chair exit alarm    Elimination Interventions: Call light in reach, Bed/chair exit alarm, Patient to call for help with toileting needs, Toileting schedule/hourly rounds    History of Falls Interventions: Bed/chair exit alarm, Door open when patient unattended, Investigate reason for fall         Problem: Pressure Injury - Risk of  Goal: *Prevention of pressure injury  Description: Document Jaziel Scale and appropriate interventions in the flowsheet.   Outcome: Progressing Towards Goal  Note: Pressure Injury Interventions:  Sensory Interventions: Monitor skin under medical devices, Pressure redistribution bed/mattress (bed type)    Moisture Interventions: Absorbent underpads, Apply protective barrier, creams and emollients, Maintain skin hydration (lotion/cream)    Activity Interventions: Pressure redistribution bed/mattress(bed type), Increase time out of bed    Mobility Interventions: Assess need for specialty bed, Pressure redistribution bed/mattress (bed type)    Nutrition Interventions: Document food/fluid/supplement intake    Friction and Shear Interventions: Apply protective barrier, creams and emollients

## 2020-06-29 VITALS
SYSTOLIC BLOOD PRESSURE: 157 MMHG | TEMPERATURE: 97.9 F | WEIGHT: 132.28 LBS | RESPIRATION RATE: 18 BRPM | HEART RATE: 65 BPM | DIASTOLIC BLOOD PRESSURE: 83 MMHG | BODY MASS INDEX: 18.98 KG/M2 | OXYGEN SATURATION: 97 %

## 2020-06-29 PROCEDURE — 74011250637 HC RX REV CODE- 250/637: Performed by: FAMILY MEDICINE

## 2020-06-29 RX ADMIN — PANTOPRAZOLE SODIUM 40 MG: 40 TABLET, DELAYED RELEASE ORAL at 06:49

## 2020-06-29 RX ADMIN — CARBIDOPA AND LEVODOPA 1.5 TABLET: 25; 100 TABLET ORAL at 09:18

## 2020-06-29 RX ADMIN — DONEPEZIL HYDROCHLORIDE 10 MG: 10 TABLET, FILM COATED ORAL at 09:18

## 2020-06-29 RX ADMIN — QUETIAPINE FUMARATE 50 MG: 25 TABLET ORAL at 09:19

## 2020-06-29 RX ADMIN — RASAGILINE 1 MG: 1 TABLET ORAL at 09:20

## 2020-06-29 RX ADMIN — Medication 10 ML: at 05:57

## 2020-06-29 RX ADMIN — CARBIDOPA AND LEVODOPA 1.5 TABLET: 25; 100 TABLET ORAL at 12:29

## 2020-06-29 RX ADMIN — CARBIDOPA AND LEVODOPA 1.5 TABLET: 25; 100 TABLET ORAL at 06:48

## 2020-06-29 NOTE — PROGRESS NOTES
Patient discharged to [facility]. Patient alert to self. Vital signs remain stable and patient is afebrile. Discharge instructions and prescriptions included packet given to [facility]. TPatient and belongings transported to lobby via [facility]. PIV removed.

## 2020-06-29 NOTE — DISCHARGE SUMMARY
Discharge Summary       PATIENT ID: Olena Aly  MRN: [id number]   YOB: 1935    DATE OF ADMISSION: 6/26/2020  5:42 AM    DATE OF DISCHARGE: 6/29/2020   PRIMARY CARE PROVIDER: Maribel Martinez MD     ATTENDING PHYSICIAN: Pastor Banks  DISCHARGING PROVIDER: Tara Cantrell MD    To contact this individual call [phone] and ask the  to page. If unavailable ask to be transferred the Adult Hospitalist Department. CONSULTATIONS: IP CONSULT TO NEUROSURGERY    PROCEDURES/SURGERIES: * No surgery found *    ADMITTING DIAGNOSES & HOSPITAL COURSE:     HPI  Poornima Frey a 82 y. o. male who was transferred from [address] [place] [address] ER with C-spine fracture.  Patient has dementia and history cannot be obtained from patient. Lupe Smith was obtained collectively by talking with patient's hospice nurse over the phone as well as review of the chart. Funmi Lowery lives at memory unit of an assisted living facility.  Patient with multiple frequent falls and at baseline is two-person assist and only can ambulate short distance.  Recently patient Seroquel dose was decreased from 100 mg nightly to 75 mg nightly with a concern of drowsiness.  Currently after the change, patient has got up overnight and apparently fell. Patient was found on the floor at the nursing home early this a.m. and was found to have head injury with large hematoma at the right temporal region with laceration.  Patient was brought to the emergency room at [address] [place] [address] and his laceration was repaired.  Further work-up reveals that patient has mildly displaced acute type II odontoid fractures with moderate to severe spinal canal narrowing at C1. Dr Verner Ouch surgery, was consulted from the ER and patient was transferred to [facility] for further evaluation. Hospital Course  Patient presents with status post mechanical fall resulting in C-spine fracture.   Neurosurgery evaluated the patient and recommended conservative management with cervical collar. Patient also sustained head injury with a hematoma on the right temple with laceration which was repaired with suture. Patient has history of dementia needing Seroquel. Patient with periodic drowsiness requiring holding Seroquel from time to time. I have not changed the Seroquel dose and will leave it up to the discretion of patient's primary care physician for further adjustment. Patient to resume hospice upon discharge back to his nursing facility. DISCHARGE DIAGNOSES / PLAN:      1. Mechanical fall  2. C-spine fracture  3. Head injury  4. Laceration of the forehead  5. Hypertensive urgency  6. Parkinson  7. Dementia     ADDITIONAL CARE RECOMMENDATIONS:     None     PENDING TEST RESULTS:   At the time of discharge the following test results are still pending: None    FOLLOW UP APPOINTMENTS:    Follow-up Information     Follow up With Specialties Details Why Contact Info    John Leos MD Internal Medicine   [address] [place] [address] 99 800 [address]      Kimberlyn Horowitz MD Neurosurgery In 6 weeks  [facility] [phone]               DIET: Cardiac Diet  Oral Nutritional Supplements: Ensure Complete or Ensure PlusThree times daily    ACTIVITY: Fall precautions, ambulate with assist.  Patient needs 24/7 supervision    WOUND CARE: Suture removal of the right temporal laceration 7/2/2020. EQUIPMENT needed: None      DISCHARGE MEDICATIONS:  Current Discharge Medication List      CONTINUE these medications which have NOT CHANGED    Details   carbidopa-levodopa (Sinemet)  mg per tablet Take 1.5 Tabs by mouth six (6) times daily. 0600, 0900, 1200, 1500, 1800, 2100      QUEtiapine (SEROqueL) 50 mg tablet Take 50 mg by mouth three (3) times daily. senna-docusate (Senna Plus) 8.6-50 mg per tablet Take 1 Tab by mouth two (2) times a day.       acetaminophen (TYLENOL) 650 mg suppository Insert 650 mg into rectum every four (4) hours as needed for Fever. omeprazole (PRILOSEC) 20 mg capsule Take 20 mg by mouth daily. rasagiline (AZILECT) 1 mg tablet Take 1 mg by mouth daily. donepezil (ARICEPT) 10 mg tablet Take 10 mg by mouth daily. STOP taking these medications       melatonin 3 mg tablet Comments:   Reason for Stopping:         midodrine (PROAMITINE) 2.5 mg tablet Comments:   Reason for Stopping:                 NOTIFY YOUR PHYSICIAN FOR ANY OF THE FOLLOWING:   Fever over 101 degrees for 24 hours. Chest pain, shortness of breath, fever, chills, nausea, vomiting, diarrhea, change in mentation, falling, weakness, bleeding. Severe pain or pain not relieved by medications. Or, any other signs or symptoms that you may have questions about. DISPOSITION:    Home With:   OT  PT  HH  RN       Long term SNF/Inpatient Rehab    Independent/assisted living    Hospice    Other:       PATIENT CONDITION AT DISCHARGE:     Functional status    Poor     Deconditioned     Independent      Cognition     Lucid     Forgetful     Dementia      Catheters/lines (plus indication)    Castaneda     PICC     PEG     None      Code status     Full code     DNR      PHYSICAL EXAMINATION AT DISCHARGE:  General:          Alert, cooperative, no distress, appears stated age. HEENT:           Atraumatic, anicteric sclerae, pink conjunctivae                          No oral ulcers, mucosa moist, throat clear, dentition fair  Neck:               Supple, symmetrical  Lungs:             Clear to auscultation bilaterally. No Wheezing or Rhonchi. No rales. Chest wall:      No tenderness  No Accessory muscle use. Heart:              Regular  rhythm,  No  murmur   No edema  Abdomen:        Soft, non-tender. Not distended. Bowel sounds normal  Extremities:     No cyanosis. No clubbing,                            Skin turgor normal, Capillary refill normal  Skin:                Not pale.   Not Jaundiced  No rashes   Psych: Not anxious or agitated. Neurologic:      Alert, moves all extremities, answers questions appropriately and responds to commands       CHRONIC MEDICAL DIAGNOSES:  Problem List as of 6/29/2020 Date Reviewed: 2/11/2020          Codes Class Noted - Resolved    Cervical vertebral fracture ([facility] [address] ICD-10-CM: S12. 9XXA  ICD-9-CM: 805.00  6/26/2020 - Present        Cervical spine fracture (HCC) ICD-10-CM: S12. 9XXA  ICD-9-CM: 805.00  6/26/2020 - Present        Syncope ICD-10-CM: R55  ICD-9-CM: 780.2  4/3/2018 - Present        Fatigue ICD-10-CM: R53.83  ICD-9-CM: 780.79  4/3/2018 - Present        Advanced care planning/counseling discussion ICD-10-CM: Z71.89  ICD-9-CM: V65.49  4/25/2016 - Present    Overview Signed 4/25/2016  2:02 PM by Allison Espinoza RN     Patient states that a completed AMD is at home. NN encouraged patient to bring a copy to the office for scanning into the medical record. Patient verbalized understanding. Dementia [facility]) ICD-10-CM: F03.90  ICD-9-CM: 294.20  1/7/2016 - Present    Overview Signed 1/7/2016  8:56 AM by Jaquelin Chavez     - shania Neuro-psyc eval 1/6/16             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 [facility]) ICD-10-CM: Diana Gonzalez, F02.80  ICD-9-CM: 332.0, 294.10  1/7/2016 - Present        HLD (hyperlipidemia) ICD-10-CM: E78.5  ICD-9-CM: 272.4  1/5/2016 - Present        GERD (gastroesophageal reflux disease) ICD-10-CM: K21.9  ICD-9-CM: 530.81  4/26/2012 - Present        Parkinson's disease ([facility] [address] ICD-10-CM: Claudis Stai  ICD-9-CM: 332.0  5/9/2011 - Present    Overview Signed 5/9/2011  1:13 PM by MD Dr Jana Fabian ICD-10-CM: R60.9  ICD-9-CM: 244. 3  Unknown - Present        Coronary atherosclerosis of native coronary artery ICD-10-CM: I25.10  ICD-9-CM: 414.01  Unknown - Present        Nonspecific abnormal electrocardiogram (ECG) (EKG) ICD-10-CM: R94.31  ICD-9-CM: 794.31  Unknown - Present        Other malaise and fatigue ICD-10-CM: R53.81, R53.83  ICD-9-CM: 780.79  Unknown - Present        Hypertension ICD-10-CM: I10  ICD-9-CM: 401.9  Unknown - Present        Chest pain, unspecified ICD-10-CM: R07.9  ICD-9-CM: 786.50  Unknown - Present        Other and unspecified hyperlipidemia ICD-10-CM: E78.5  ICD-9-CM: 272.4  Unknown - Present        RESOLVED: Delirium ICD-10-CM: R41.0  ICD-9-CM: 780.09  1/7/2016 - 1/9/2016        RESOLVED: Leg weakness ICD-10-CM: R29.898  ICD-9-CM: 729.89  1/5/2016 - 1/9/2016        RESOLVED: ARF (acute renal failure) (HCC) ICD-10-CM: N17.9  ICD-9-CM: 584.9  1/5/2016 - 1/9/2016        RESOLVED: Encephalopathy ICD-10-CM: G93.40  ICD-9-CM: 348.30  1/5/2016 - 1/9/2016              Greater than 60 minutes were spent with the patient on counseling and coordination of care    Signed:   Vicky Manzanares MD  6/29/2020  10:52 AM

## 2020-06-29 NOTE — PROGRESS NOTES
Called report to Thurmon Angelucci,  at [facility]. Patient to be transported via [facility]. Call bell within reach, wife a bedside. Will continue to monitor.

## 2020-06-29 NOTE — DISCHARGE INSTRUCTIONS
Patient Education        Broken Neck: Care Instructions  Your Care Instructions     A broken neck can range from a small, hairline crack, to a bone or bones breaking into two or more pieces. Treatment for a broken neck depends on how bad the break is and which bones are involved. You may be sent home with a neck brace or collar. You can help your neck heal with care at home. You heal best when you take good care of yourself. Eat a variety of healthy foods, and don't smoke. Follow-up care is a key part of your treatment and safety. Be sure to make and go to all appointments, and call your doctor if you are having problems. It's also a good idea to know your test results and keep a list of the medicines you take. How can you care for yourself at home? · If you were fitted for a neck brace, wear it exactly as directed by your doctor. Do not take it off until your doctor tells you to. · Be safe with medicines. Take pain medicines exactly as directed. ? If the doctor gave you a prescription medicine for pain, take it as prescribed. ? If you are not taking a prescription pain medicine, ask your doctor if you can take an over-the-counter medicine. · Follow your doctor's directions for returning to your normal activities. · Do any exercises that you are given to keep your muscles strong and reduce stiffness. · You can try using heat or ice to see if it helps. ? Try using a heating pad on a low or medium setting for 15 to 20 minutes every 2 to 3 hours. Try a warm shower in place of one session with the heating pad. You can also buy single-use heat wraps that last up to 8 hours. ? You can also try an ice pack for 10 to 15 minutes every 2 to 3 hours. · Make sure that paths in your home are clear so that you do not fall. Also make sure that lighting is good and that carpets are tacked down to prevent tripping. · Do not drive unless your doctor says that it is okay.  If you are allowed to drive, always wear a seat belt.  · Talk to your doctor about medicines or changes in your diet that can help make your bones stronger. When should you call for help? [id number] anytime you think you may need emergency care. For example, call if:  · You are unable to move an arm or a leg at all. Call your doctor now or seek immediate medical care if:  · You have new or worse symptoms in your arms, legs, belly, or buttocks. Symptoms may include:  ? Numbness or tingling. ? Weakness. ? Pain. · You lose bladder or bowel control. Watch closely for changes in your health, and be sure to contact your doctor if:  · You are not getting better as expected. Where can you learn more? Go to http://jose martin-jorge luis.info/  Enter U000 in the search box to learn more about \"Broken Neck: Care Instructions. \"  Current as of: March 2, 2020               Content Version: 12.5  © 7245-0021 [employer]. Care instructions adapted under license by [employer] (which disclaims liability or warranty for this information). If you have questions about a medical condition or this instruction, always ask your healthcare professional. [place] [address] any warranty or liability for your use of this information.

## 2020-06-29 NOTE — PROGRESS NOTES
WALTER:  1. Return to [facility]. 2. At [address] message advised transport time Chase Hali  3. [facility] arranged for 1:30  4. Called [facility] [employer] to advise that patient will transport at 1:30, left message provided details or transport time. 5. Confirming Hospice DME's will arrive by transport time, pending return call from [facility]. CM noted that previous CM confirmed return today to [facility] sn[facility] fr [facility]ospice with [facility]. CM sent message to [facility] to advise transport time. CM placed call to Director Jael Hutchinson to advise the time for transport. Transport has been confirmed for 1:30 today with [facility]. Nurse not available when CM call. CM left contacts to return call if needed. Medical assistant at [facility] provided [address] will Fax# [phone] to fax discharge clinicals. Call Report [phone] (Ask for Nurse Station). Ambulance form placed on the chart. AVS and discharge summary sent via [employer]. Envelope containing MARS, KARDEX, AVS & discharge summary, RX, & [facility] Directive will be sent with patient. CM received return call from Director, Jael Hutchinson who advised transport time if agreeable. Vita also advised that patient doesn't have a bed so she wanted to confirm that [facility] will have all DME's at [facility] by the time the patient arrives after 1:30 today. CM called [facility] spoke with liaison who is confirming and will return call to . CM also sent message via [employer] about DME's prior to [address] at [facility] today. Awaiting a return call.     CM will follow     JIGAR San/LJ

## 2020-06-29 NOTE — PROGRESS NOTES
Problem: Falls - Risk of  Goal: *Absence of Falls  Description: Document Laura Dear Fall Risk and appropriate interventions in the flowsheet. Outcome: Progressing Towards Goal  Note: Fall Risk Interventions:       Mentation Interventions: Bed/chair exit alarm, Door open when patient unattended, More frequent rounding, Reorient patient, Toileting rounds    Medication Interventions: Patient to call before getting OOB, Teach patient to arise slowly, Bed/chair exit alarm    Elimination Interventions: Call light in reach, Bed/chair exit alarm, Patient to call for help with toileting needs, Toileting schedule/hourly rounds    History of Falls Interventions: Bed/chair exit alarm, Door open when patient unattended, Investigate reason for fall         Problem: Pressure Injury - Risk of  Goal: *Prevention of pressure injury  Description: Document Jaziel Scale and appropriate interventions in the flowsheet.   Outcome: Progressing Towards Goal  Note: Pressure Injury Interventions:  Sensory Interventions: Monitor skin under medical devices, Pressure redistribution bed/mattress (bed type)    Moisture Interventions: Absorbent underpads, Apply protective barrier, creams and emollients, Maintain skin hydration (lotion/cream)    Activity Interventions: Pressure redistribution bed/mattress(bed type), Increase time out of bed    Mobility Interventions: Assess need for specialty bed, Pressure redistribution bed/mattress (bed type)    Nutrition Interventions: Document food/fluid/supplement intake    Friction and Shear Interventions: Apply protective barrier, creams and emollients

## 2020-06-29 NOTE — PROGRESS NOTES
Problem: Falls - Risk of  Goal: *Absence of Falls  Description: Document Adrien Gay Fall Risk and appropriate interventions in the flowsheet.   Outcome: Progressing Towards Goal  Note: Fall Risk Interventions:       Mentation Interventions: Door open when patient unattended, Room close to nurse's station    Medication Interventions: Patient to call before getting OOB, Teach patient to arise slowly, Bed/chair exit alarm    Elimination Interventions: Call light in reach, Toileting schedule/hourly rounds    History of Falls Interventions: Door open when patient unattended, Room close to nurse's station         Problem: Patient Education: Go to Patient Education Activity  Goal: Patient/Family Education  Outcome: Progressing Towards Goal

## 2021-08-03 PROBLEM — E78.5 OTHER AND UNSPECIFIED HYPERLIPIDEMIA: Status: RESOLVED | Noted: 2021-08-03 | Resolved: 2021-08-03

## 2022-03-18 PROBLEM — S12.9XXA CERVICAL SPINE FRACTURE (HCC): Status: ACTIVE | Noted: 2020-06-26

## 2022-03-18 PROBLEM — S12.9XXA CERVICAL VERTEBRAL FRACTURE (HCC): Status: ACTIVE | Noted: 2020-06-26

## 2022-03-19 PROBLEM — R53.83 FATIGUE: Status: ACTIVE | Noted: 2018-04-03

## 2022-03-19 PROBLEM — R55 SYNCOPE: Status: ACTIVE | Noted: 2018-04-03

## 2023-05-13 RX ORDER — OMEPRAZOLE 20 MG/1
20 CAPSULE, DELAYED RELEASE ORAL DAILY
COMMUNITY

## 2023-05-13 RX ORDER — RASAGILINE 1 MG/1
1 TABLET ORAL DAILY
COMMUNITY

## 2023-05-13 RX ORDER — AMOXICILLIN 250 MG
1 CAPSULE ORAL 2 TIMES DAILY
COMMUNITY

## 2023-05-13 RX ORDER — QUETIAPINE FUMARATE 50 MG/1
TABLET, FILM COATED ORAL 3 TIMES DAILY
COMMUNITY

## 2023-05-13 RX ORDER — ACETAMINOPHEN 650 MG/1
SUPPOSITORY RECTAL EVERY 4 HOURS PRN
COMMUNITY

## 2023-05-13 RX ORDER — DONEPEZIL HYDROCHLORIDE 10 MG/1
10 TABLET, FILM COATED ORAL DAILY
COMMUNITY

## 2024-03-28 NOTE — MR AVS SNAPSHOT
Visit Information Date & Time Provider Department Dept. Phone Encounter #  
 6/9/2017  1:40 PM Jovana Butts MD [facility] [phone] [id number] Upcoming Health Maintenance Date Due  
 GLAUCOMA SCREENING Q2Y 11/12/2002 MEDICARE YEARLY EXAM 4/26/2017 INFLUENZA AGE 9 TO ADULT 8/1/2017 DTaP/Tdap/Td series (2 - Td) 9/4/2024 Allergies as of 6/9/2017  Review Complete On: 6/9/2017 By: Jada Hwang LPN Severity Noted Reaction Type Reactions Beta Blocker [Beta-blockers (Beta-adrenergic Blocking Agts)]  10/13/2010    Swelling, Other (comments) Confusion, fatigue, and weakness Facial swelling Iodine  03/03/2009    Swelling Facial swelling Current Immunizations  Reviewed on 4/25/2016 Name Date Hep A Vaccine 1/1/1995 Hep A and Hep B Vaccine 9/4/2014 Hep B Vaccine 1/1/1995 Influenza Vaccine 10/1/2015 Pneumococcal Conjugate (PCV-13) 10/29/2015 Pneumococcal Polysaccharide (PPSV-23) 9/30/2014 Tdap 9/4/2014 Zoster Vaccine, Live 1/1/2006 Not reviewed this visit You Were Diagnosed With   
  
 Codes Comments Atherosclerosis of native coronary artery of native heart without angina pectoris    -  Primary ICD-10-CM: I25.10 ICD-9-CM: 414.01 Essential hypertension     ICD-10-CM: I10 
ICD-9-CM: 401.9 Vitals BP Pulse Height(growth percentile) Weight(growth percentile) BMI Smoking Status 110/70 61 5' 11\" (1.803 m) 182 lb (82.6 kg) 25.38 kg/m2 Never Smoker Vitals History BMI and BSA Data Body Mass Index Body Surface Area  
 25.38 kg/m 2 2.03 m 2 Preferred Pharmacy Pharmacy Name Phone [facility], [address] [phone] Your Updated Medication List  
  
   
This list is accurate as of: 6/9/17  2:18 PM.  Always use your most recent med list.  
  
  
  
  
 aspirin 325 mg tablet Commonly known as:  ASPIRIN  
take 325 mg by mouth daily. atorvastatin 80 mg tablet Commonly known as:  LIPITOR  
TAKE ONE TABLET BY MOUTH EVERY EVENING  
  
 AZILECT 1 mg tablet Generic drug:  rasagiline Take 10 mg by mouth daily. COMTAN 200 mg tablet Generic drug:  entacapone Take 200 mg by mouth five (5) times daily. Same times a Sinemet  
  
 donepezil 10 mg tablet Commonly known as:  ARICEPT Take 10 mg by mouth nightly. losartan 25 mg tablet Commonly known as:  COZAAR Take 1 tablet (25 mg) at 6 am.  Take 1/2 tablet (12.5 mg) at 3 pm.  
  
 MYRBETRIQ 25 mg ER tablet Generic drug:  mirabegron ER Take 25 mg by mouth daily. PriLOSEC 20 mg capsule Generic drug:  omeprazole Take 20 mg by mouth Daily (before breakfast). * SINEMET  mg per tablet Generic drug:  carbidopa-levodopa Take 1 Tab by mouth five (5) times daily. Patient takes at 6am, 10am, 2pm, 6pm, 2 tabs at bedtime (It is very important for the patient to receive his medication on time or he will experience side effects)  At 6am, patient takes 1 and 1/2 tablet. Indications: Parkinsonism * carbidopa-levodopa CR  mg per tablet Commonly known as:  SINEMET CR Take 1 Tab by mouth nightly. Patient takes at 10 pm (It is very important for the patient to receive his medication on time or he will experience side effects) * Notice: This list has 2 medication(s) that are the same as other medications prescribed for you. Read the directions carefully, and ask your doctor or other care provider to review them with you. We Performed the Following AMB POC EKG ROUTINE W/ 12 LEADS, INTER & REP [29593 CPT(R)] Patient Instructions Follow up with Prasad Boateng in 4 months Hold Cozaar BP less than 110 Give extra dose of Cozaar 25 mg above number 170 check twice Introducing [facility]!    
 [facility] introduces [employer] patient portal. Now you can access parts of your medical record, email your doctor's office, and request medication refills online. 1. In your internet browser, go to [email or web address] 2. Click on the First Time User? Click Here link in the Sign In box. You will see the New Member Sign Up page. 3. Enter your [employer] Access Code exactly as it appears below. You will not need to use this code after youve completed the sign-up process. If you do not sign up before the expiration date, you must request a new code. · [employer] Access Code: [email or web address] Expires: 9/7/2017  2:18 PM 
 
4. Enter the last four digits of your Social Security Number (xxxx) and Date of Birth (mm/dd/yyyy) as indicated and click Submit. You will be taken to the next sign-up page. 5. Create a [employer] ID. This will be your [employer] login ID and cannot be changed, so think of one that is secure and easy to remember. 6. Create a [employer] password. You can change your password at any time. 7. Enter your Password Reset Question and Answer. This can be used at a later time if you forget your password. 8. Enter your e-mail address. You will receive e-mail notification when new information is available in [address] 9. Click Sign Up. You can now view and download portions of your medical record. 10. Click the Download Summary menu link to download a portable copy of your medical information. If you have questions, please visit the Frequently Asked Questions section of the [employer] website. Remember, [employer] is NOT to be used for urgent needs. For medical emergencies, dial 911. Now available from your iPhone and Android! Please provide this summary of care documentation to your next provider. Your primary care clinician is listed as Tiff 68. If you have any questions after today's visit, please call [phone]. 0